# Patient Record
Sex: FEMALE | Race: ASIAN | NOT HISPANIC OR LATINO | Employment: FULL TIME | URBAN - METROPOLITAN AREA
[De-identification: names, ages, dates, MRNs, and addresses within clinical notes are randomized per-mention and may not be internally consistent; named-entity substitution may affect disease eponyms.]

---

## 2018-05-16 ENCOUNTER — OFFICE VISIT (OUTPATIENT)
Dept: URGENT CARE | Facility: URGENT CARE | Age: 48
End: 2018-05-16
Payer: COMMERCIAL

## 2018-05-16 VITALS
DIASTOLIC BLOOD PRESSURE: 67 MMHG | HEART RATE: 68 BPM | HEIGHT: 61 IN | OXYGEN SATURATION: 100 % | BODY MASS INDEX: 21.56 KG/M2 | WEIGHT: 114.2 LBS | TEMPERATURE: 98 F | SYSTOLIC BLOOD PRESSURE: 96 MMHG | RESPIRATION RATE: 16 BRPM

## 2018-05-16 DIAGNOSIS — R82.90 NONSPECIFIC FINDING ON EXAMINATION OF URINE: ICD-10-CM

## 2018-05-16 DIAGNOSIS — R30.0 DYSURIA: Primary | ICD-10-CM

## 2018-05-16 LAB
BACTERIA #/AREA URNS HPF: ABNORMAL /HPF
RBC #/AREA URNS AUTO: ABNORMAL /HPF
URNS CMNT MICRO: ABNORMAL
WBC #/AREA URNS AUTO: ABNORMAL /HPF

## 2018-05-16 PROCEDURE — 87088 URINE BACTERIA CULTURE: CPT | Performed by: FAMILY MEDICINE

## 2018-05-16 PROCEDURE — 87086 URINE CULTURE/COLONY COUNT: CPT | Performed by: FAMILY MEDICINE

## 2018-05-16 PROCEDURE — 81015 MICROSCOPIC EXAM OF URINE: CPT | Performed by: FAMILY MEDICINE

## 2018-05-16 PROCEDURE — 99203 OFFICE O/P NEW LOW 30 MIN: CPT | Performed by: FAMILY MEDICINE

## 2018-05-16 PROCEDURE — 87186 SC STD MICRODIL/AGAR DIL: CPT | Performed by: FAMILY MEDICINE

## 2018-05-16 RX ORDER — NITROFURANTOIN 25; 75 MG/1; MG/1
100 CAPSULE ORAL 2 TIMES DAILY
Qty: 14 CAPSULE | Refills: 0 | Status: SHIPPED | OUTPATIENT
Start: 2018-05-16 | End: 2018-05-23

## 2018-05-16 NOTE — PROGRESS NOTES
"SUBJECTIVE:   Ann Parmar is a 48 year old female who  presents today for a possible UTI. Symptoms of dysuria and frequency have been going on for 1week(s).  Hematuria yes .  sudden onsetand moderate.  There is no history of fever, chills, nausea or vomiting.  No history of vaginal discharge. This patient does  have a history of urinary tract infections. Patient denies rigors, flank pain, temperature > 101 degrees F. and Vomiting, significant nausea or diarrhea or vaginal discharge . She is currently having her periods     No past medical history on file.  No current outpatient prescriptions on file.     Social History   Substance Use Topics     Smoking status: Light Tobacco Smoker     Smokeless tobacco: Never Used     Alcohol use Not on file       ROS:   Review of systems negative except as stated above.    OBJECTIVE:  BP 96/67  Pulse 68  Temp 98  F (36.7  C) (Oral)  Resp 16  Ht 5' 1\" (1.549 m)  Wt 114 lb 3.2 oz (51.8 kg)  SpO2 100%  BMI 21.58 kg/m2  GENERAL APPEARANCE: healthy, alert and no distress  RESP: lungs clear to auscultation - no rales, rhonchi or wheezes  CV: regular rates and rhythm, normal S1 S2, no murmur noted  ABDOMEN:  soft, nontender, no HSM or masses and bowel sounds normal  BACK: No CVA tenderness  SKIN: no suspicious lesions or rashes      Results for orders placed or performed in visit on 05/16/18   Urine Microscopic   Result Value Ref Range    WBC Urine  (A) OTO5^0 - 5 /HPF    RBC Urine 25-50 (A) OTO2^O - 2 /HPF    Bacteria Urine Few (A) NEG^Negative /HPF    Comment Urine Interfering substances, dipstick not done        ASSESSMENT:   Lower, uncomplicated urinary tract infection.  Ann was seen today for urgent care.    Diagnoses and all orders for this visit:    Dysuria  -     Cancel: UA with Microscopic reflex to Culture  -     Urine Microscopic  -     Urine Culture Aerobic Bacterial  -     nitroFURantoin, macrocrystal-monohydrate, (MACROBID) 100 MG capsule; Take 1 " capsule (100 mg) by mouth 2 times daily for 7 days    Nonspecific finding on examination of urine  -     Urine Culture Aerobic Bacterial      Follow up if  symptoms fail to improve or worsens   Pt understood and agreed with plan       PLAN:  As per ordered above  Drink plenty of fluids.  Prevention and treatment of UTI's discussed.Signs and symptoms of pyelonephritis mentioned.  Follow up with primary care physician if not improving

## 2018-05-16 NOTE — MR AVS SNAPSHOT
"              After Visit Summary   2018    Ann Parmar    MRN: [id number]           Patient Information     Date Of Birth          1970        Visit Information        Provider Department      2018 10:10 AM Valery Hernandez MD [facility]        Today's Diagnoses     Dysuria    -  1    Nonspecific finding on examination of urine           Follow-ups after your visit        Who to contact     If you have questions or need follow up information about today's clinic visit or your schedule please contact [facility] directly at [phone].  Normal or non-critical lab and imaging results will be communicated to you by MyChart, letter or phone within 4 business days after the clinic has received the results. If you do not hear from us within 7 days, please contact the clinic through [employer] or phone. If you have a critical or abnormal lab result, we will notify you by phone as soon as possible.  Submit refill requests through [employer] or call your pharmacy and they will forward the refill request to us. Please allow 3 business days for your refill to be completed.          Additional Information About Your Visit        MyChart Information     [employer] lets you send messages to your doctor, view your test results, renew your prescriptions, schedule appointments and more. To sign up, go to [email or web address] . Click on \"Log in\" on the left side of the screen, which will take you to the Welcome page. Then click on \"Sign up Now\" on the right side of the page.     You will be asked to enter the access code listed below, as well as some personal information. Please follow the directions to create your username and password.     Your access code is: [email or web address]  Expires: 2018 10:51 AM     Your access code will  in 90 days. If you need help or a new code, please call your [place] clinic or [phone].        Care EveryWhere ID     " "This is your Care EveryWhere ID. This could be used by other organizations to access your [place] [facility] medical records  [id number]        Your Vitals Were     Pulse Temperature Respirations Height Pulse Oximetry BMI (Body Mass Index)    68 98  F (36.7  C) (Oral) 16 5' 1\" (1.549 m) 100% 21.58 kg/m2       Blood Pressure from Last 3 Encounters:   05/16/18 96/67    Weight from Last 3 Encounters:   05/16/18 114 lb 3.2 oz (51.8 kg)              We Performed the Following     Urine Culture Aerobic Bacterial     Urine Microscopic          Today's Medication Changes          These changes are accurate as of 5/16/18 10:51 AM.  If you have any questions, ask your nurse or doctor.               Start taking these medicines.        Dose/Directions    nitroFURantoin (macrocrystal-monohydrate) 100 MG capsule   Commonly known as:  MACROBID   Used for:  Dysuria   Started by:  Valery Hernandez MD        Dose:  100 mg   Take 1 capsule (100 mg) by mouth 2 times daily for 7 days   Quantity:  14 capsule   Refills:  0            Where to get your medicines      These medications were sent to [facility] [address] [address]     Phone:  [phone]     nitroFURantoin (macrocrystal-monohydrate) 100 MG capsule                Primary Care Provider Fax #    Physician No Ref-Primary [phone]       No address on file        Equal Access to Services     ZBIGNIEW SAINI AH: Hadii abdiel molinao Sonaresh, waaxda luqadaha, qaybta kaalmada adejeffda, agnieszka reis . So [facility] [phone].    ATENCIÓN: Si habla español, tiene a castro disposición servicios gratuitos de asistencia lingüística. Llame al [phone].    We comply with applicable federal civil rights laws and [place] laws. We do not discriminate on the basis of race, color, national origin, age, disability, sex, sexual orientation, or gender identity.            Thank you!     Thank you for " choosing [facility]  for your care. Our goal is always to provide you with excellent care. Hearing back from our patients is one way we can continue to improve our services. Please take a few minutes to complete the written survey that you may receive in the mail after your visit with us. Thank you!             Your Updated Medication List - Protect others around you: Learn how to safely use, store and throw away your medicines at www.disposemymeds.org.          This list is accurate as of 5/16/18 10:51 AM.  Always use your most recent med list.                   Brand Name Dispense Instructions for use Diagnosis    nitroFURantoin (macrocrystal-monohydrate) 100 MG capsule    MACROBID    14 capsule    Take 1 capsule (100 mg) by mouth 2 times daily for 7 days    Dysuria

## 2018-05-17 LAB
BACTERIA SPEC CULT: ABNORMAL
SPECIMEN SOURCE: ABNORMAL

## 2019-07-15 ENCOUNTER — HOSPITAL ENCOUNTER (INPATIENT)
Facility: CLINIC | Age: 49
LOS: 1 days | Discharge: HOME | DRG: 812 | End: 2019-07-16
Attending: INTERNAL MEDICINE | Admitting: INTERNAL MEDICINE

## 2019-07-15 ENCOUNTER — OFFICE VISIT (OUTPATIENT)
Dept: URGENT CARE | Facility: URGENT CARE | Age: 49
End: 2019-07-15

## 2019-07-15 VITALS
WEIGHT: 116 LBS | HEART RATE: 84 BPM | SYSTOLIC BLOOD PRESSURE: 90 MMHG | TEMPERATURE: 99 F | HEIGHT: 61 IN | RESPIRATION RATE: 16 BRPM | DIASTOLIC BLOOD PRESSURE: 50 MMHG | OXYGEN SATURATION: 100 % | BODY MASS INDEX: 21.9 KG/M2

## 2019-07-15 DIAGNOSIS — N93.9 VAGINAL BLEEDING: ICD-10-CM

## 2019-07-15 DIAGNOSIS — N92.1 MENORRHAGIA WITH IRREGULAR CYCLE: Primary | ICD-10-CM

## 2019-07-15 DIAGNOSIS — R53.83 OTHER FATIGUE: ICD-10-CM

## 2019-07-15 DIAGNOSIS — D64.9 ANEMIA, UNSPECIFIED TYPE: ICD-10-CM

## 2019-07-15 DIAGNOSIS — M62.81 GENERALIZED MUSCLE WEAKNESS: ICD-10-CM

## 2019-07-15 DIAGNOSIS — D62 ACUTE BLOOD LOSS ANEMIA: Primary | ICD-10-CM

## 2019-07-15 DIAGNOSIS — D50.0 IRON DEFICIENCY ANEMIA DUE TO CHRONIC BLOOD LOSS: ICD-10-CM

## 2019-07-15 LAB
ALBUMIN SERPL-MCNC: 3.4 G/DL (ref 3.4–5)
ALBUMIN UR-MCNC: ABNORMAL MG/DL
ALP SERPL-CCNC: 49 U/L (ref 40–150)
ALT SERPL W P-5'-P-CCNC: 16 U/L (ref 0–50)
ANION GAP SERPL CALCULATED.3IONS-SCNC: 4 MMOL/L (ref 3–14)
APPEARANCE UR: ABNORMAL
AST SERPL W P-5'-P-CCNC: 11 U/L (ref 0–45)
BACTERIA #/AREA URNS HPF: ABNORMAL /HPF
BASOPHILS # BLD AUTO: 0.1 10E9/L (ref 0–0.2)
BASOPHILS NFR BLD AUTO: 1.1 %
BILIRUB SERPL-MCNC: 0.4 MG/DL (ref 0.2–1.3)
BILIRUB UR QL STRIP: NEGATIVE
BLD PROD TYP BPU: NORMAL
BLD PROD TYP BPU: NORMAL
BLD UNIT ID BPU: 0
BLD UNIT ID BPU: 0
BLOOD PRODUCT CODE: NORMAL
BLOOD PRODUCT CODE: NORMAL
BPU ID: NORMAL
BPU ID: NORMAL
BUN SERPL-MCNC: 11 MG/DL (ref 7–30)
CALCIUM SERPL-MCNC: 8.2 MG/DL (ref 8.5–10.1)
CHLORIDE SERPL-SCNC: 109 MMOL/L (ref 94–109)
CO2 SERPL-SCNC: 26 MMOL/L (ref 20–32)
COLOR UR AUTO: YELLOW
CREAT SERPL-MCNC: 0.56 MG/DL (ref 0.52–1.04)
DIFFERENTIAL METHOD BLD: ABNORMAL
EOSINOPHIL # BLD AUTO: 0.1 10E9/L (ref 0–0.7)
EOSINOPHIL NFR BLD AUTO: 1.1 %
ERYTHROCYTE [DISTWIDTH] IN BLOOD BY AUTOMATED COUNT: 20.6 % (ref 10–15)
FERRITIN SERPL-MCNC: <1 NG/ML (ref 8–252)
GFR SERPL CREATININE-BSD FRML MDRD: >90 ML/MIN/{1.73_M2}
GLUCOSE SERPL-MCNC: 111 MG/DL (ref 70–99)
GLUCOSE UR STRIP-MCNC: NEGATIVE MG/DL
HCG UR QL: NEGATIVE
HCT VFR BLD AUTO: 15.7 % (ref 35–47)
HGB BLD-MCNC: 4.2 G/DL (ref 11.7–15.7)
HGB UR QL STRIP: ABNORMAL
HYALINE CASTS #/AREA URNS LPF: ABNORMAL /LPF
IRON SATN MFR SERPL: 2 % (ref 15–46)
IRON SERPL-MCNC: 9 UG/DL (ref 35–180)
KETONES UR STRIP-MCNC: NEGATIVE MG/DL
LEUKOCYTE ESTERASE UR QL STRIP: NEGATIVE
LYMPHOCYTES # BLD AUTO: 1.5 10E9/L (ref 0.8–5.3)
LYMPHOCYTES NFR BLD AUTO: 17.9 %
MCH RBC QN AUTO: 14.6 PG (ref 26.5–33)
MCHC RBC AUTO-ENTMCNC: 26.8 G/DL (ref 31.5–36.5)
MCV RBC AUTO: 55 FL (ref 78–100)
MONOCYTES # BLD AUTO: 0.3 10E9/L (ref 0–1.3)
MONOCYTES NFR BLD AUTO: 4.2 %
MUCOUS THREADS #/AREA URNS LPF: PRESENT /LPF
NEUTROPHILS # BLD AUTO: 6.2 10E9/L (ref 1.6–8.3)
NEUTROPHILS NFR BLD AUTO: 75.7 %
NITRATE UR QL: NEGATIVE
NON-SQ EPI CELLS #/AREA URNS LPF: ABNORMAL /LPF
PH UR STRIP: 7.5 PH (ref 5–7)
PLATELET # BLD AUTO: 436 10E9/L (ref 150–450)
POTASSIUM SERPL-SCNC: 4 MMOL/L (ref 3.4–5.3)
PROT SERPL-MCNC: 7.3 G/DL (ref 6.8–8.8)
RBC # BLD AUTO: 2.88 10E12/L (ref 3.8–5.2)
RBC #/AREA URNS AUTO: ABNORMAL /HPF
SODIUM SERPL-SCNC: 139 MMOL/L (ref 133–144)
SOURCE: ABNORMAL
SP GR UR STRIP: 1.01 (ref 1–1.03)
TIBC SERPL-MCNC: 459 UG/DL (ref 240–430)
TRANSFUSION STATUS PATIENT QL: NORMAL
UROBILINOGEN UR STRIP-ACNC: 0.2 EU/DL (ref 0.2–1)
WBC # BLD AUTO: 8.1 10E9/L (ref 4–11)
WBC #/AREA URNS AUTO: ABNORMAL /HPF

## 2019-07-15 PROCEDURE — 86923 COMPATIBILITY TEST ELECTRIC: CPT | Performed by: EMERGENCY MEDICINE

## 2019-07-15 PROCEDURE — 12000000 ZZH R&B MED SURG/OB

## 2019-07-15 PROCEDURE — 85025 COMPLETE CBC W/AUTO DIFF WBC: CPT | Performed by: PHYSICIAN ASSISTANT

## 2019-07-15 PROCEDURE — 82728 ASSAY OF FERRITIN: CPT | Performed by: PHYSICIAN ASSISTANT

## 2019-07-15 PROCEDURE — 25800030 ZZH RX IP 258 OP 636: Performed by: INTERNAL MEDICINE

## 2019-07-15 PROCEDURE — 83540 ASSAY OF IRON: CPT | Performed by: PHYSICIAN ASSISTANT

## 2019-07-15 PROCEDURE — 83550 IRON BINDING TEST: CPT | Performed by: PHYSICIAN ASSISTANT

## 2019-07-15 PROCEDURE — 81001 URINALYSIS AUTO W/SCOPE: CPT | Performed by: PHYSICIAN ASSISTANT

## 2019-07-15 PROCEDURE — 36415 COLL VENOUS BLD VENIPUNCTURE: CPT | Performed by: EMERGENCY MEDICINE

## 2019-07-15 PROCEDURE — 80053 COMPREHEN METABOLIC PANEL: CPT | Performed by: PHYSICIAN ASSISTANT

## 2019-07-15 PROCEDURE — 99207 ZZC OFFICE-HOSPITAL ADMIT: CPT | Performed by: PHYSICIAN ASSISTANT

## 2019-07-15 PROCEDURE — 81025 URINE PREGNANCY TEST: CPT | Performed by: PHYSICIAN ASSISTANT

## 2019-07-15 PROCEDURE — 86901 BLOOD TYPING SEROLOGIC RH(D): CPT | Performed by: EMERGENCY MEDICINE

## 2019-07-15 PROCEDURE — 99223 1ST HOSP IP/OBS HIGH 75: CPT | Mod: AI | Performed by: INTERNAL MEDICINE

## 2019-07-15 PROCEDURE — 86900 BLOOD TYPING SEROLOGIC ABO: CPT | Performed by: EMERGENCY MEDICINE

## 2019-07-15 PROCEDURE — 86850 RBC ANTIBODY SCREEN: CPT | Performed by: EMERGENCY MEDICINE

## 2019-07-15 PROCEDURE — P9016 RBC LEUKOCYTES REDUCED: HCPCS | Performed by: EMERGENCY MEDICINE

## 2019-07-15 PROCEDURE — 99285 EMERGENCY DEPT VISIT HI MDM: CPT | Mod: 25

## 2019-07-15 PROCEDURE — 83010 ASSAY OF HAPTOGLOBIN QUANT: CPT | Performed by: EMERGENCY MEDICINE

## 2019-07-15 PROCEDURE — 36430 TRANSFUSION BLD/BLD COMPNT: CPT

## 2019-07-15 RX ORDER — POLYETHYLENE GLYCOL 3350 17 G/17G
17 POWDER, FOR SOLUTION ORAL DAILY PRN
Status: DISCONTINUED | OUTPATIENT
Start: 2019-07-15 | End: 2019-07-16 | Stop reason: HOSPADM

## 2019-07-15 RX ORDER — SODIUM CHLORIDE 9 MG/ML
INJECTION, SOLUTION INTRAVENOUS CONTINUOUS
Status: DISCONTINUED | OUTPATIENT
Start: 2019-07-15 | End: 2019-07-16 | Stop reason: HOSPADM

## 2019-07-15 RX ORDER — POTASSIUM CHLORIDE 1500 MG/1
20-40 TABLET, EXTENDED RELEASE ORAL
Status: DISCONTINUED | OUTPATIENT
Start: 2019-07-15 | End: 2019-07-16 | Stop reason: HOSPADM

## 2019-07-15 RX ORDER — MAGNESIUM SULFATE HEPTAHYDRATE 40 MG/ML
4 INJECTION, SOLUTION INTRAVENOUS EVERY 4 HOURS PRN
Status: DISCONTINUED | OUTPATIENT
Start: 2019-07-15 | End: 2019-07-16 | Stop reason: HOSPADM

## 2019-07-15 RX ORDER — ACETAMINOPHEN 650 MG/1
650 SUPPOSITORY RECTAL EVERY 4 HOURS PRN
Status: DISCONTINUED | OUTPATIENT
Start: 2019-07-15 | End: 2019-07-16 | Stop reason: HOSPADM

## 2019-07-15 RX ORDER — POTASSIUM CHLORIDE 1.5 G/1.58G
20-40 POWDER, FOR SOLUTION ORAL
Status: DISCONTINUED | OUTPATIENT
Start: 2019-07-15 | End: 2019-07-16 | Stop reason: HOSPADM

## 2019-07-15 RX ORDER — ONDANSETRON 2 MG/ML
4 INJECTION INTRAMUSCULAR; INTRAVENOUS EVERY 6 HOURS PRN
Status: DISCONTINUED | OUTPATIENT
Start: 2019-07-15 | End: 2019-07-16 | Stop reason: HOSPADM

## 2019-07-15 RX ORDER — NALOXONE HYDROCHLORIDE 0.4 MG/ML
.1-.4 INJECTION, SOLUTION INTRAMUSCULAR; INTRAVENOUS; SUBCUTANEOUS
Status: DISCONTINUED | OUTPATIENT
Start: 2019-07-15 | End: 2019-07-16 | Stop reason: HOSPADM

## 2019-07-15 RX ORDER — POTASSIUM CHLORIDE 7.45 MG/ML
10 INJECTION INTRAVENOUS
Status: DISCONTINUED | OUTPATIENT
Start: 2019-07-15 | End: 2019-07-16 | Stop reason: HOSPADM

## 2019-07-15 RX ORDER — POTASSIUM CHLORIDE 29.8 MG/ML
20 INJECTION INTRAVENOUS
Status: DISCONTINUED | OUTPATIENT
Start: 2019-07-15 | End: 2019-07-16 | Stop reason: HOSPADM

## 2019-07-15 RX ORDER — ACETAMINOPHEN 325 MG/1
650 TABLET ORAL EVERY 4 HOURS PRN
Status: DISCONTINUED | OUTPATIENT
Start: 2019-07-15 | End: 2019-07-16 | Stop reason: HOSPADM

## 2019-07-15 RX ORDER — ONDANSETRON 4 MG/1
4 TABLET, ORALLY DISINTEGRATING ORAL EVERY 6 HOURS PRN
Status: DISCONTINUED | OUTPATIENT
Start: 2019-07-15 | End: 2019-07-16 | Stop reason: HOSPADM

## 2019-07-15 RX ORDER — BISACODYL 10 MG
10 SUPPOSITORY, RECTAL RECTAL DAILY PRN
Status: DISCONTINUED | OUTPATIENT
Start: 2019-07-15 | End: 2019-07-16 | Stop reason: HOSPADM

## 2019-07-15 RX ORDER — AMOXICILLIN 250 MG
1 CAPSULE ORAL 2 TIMES DAILY PRN
Status: DISCONTINUED | OUTPATIENT
Start: 2019-07-15 | End: 2019-07-16 | Stop reason: HOSPADM

## 2019-07-15 RX ORDER — CALCIUM CARBONATE 500 MG/1
1000 TABLET, CHEWABLE ORAL 4 TIMES DAILY PRN
Status: DISCONTINUED | OUTPATIENT
Start: 2019-07-15 | End: 2019-07-16 | Stop reason: HOSPADM

## 2019-07-15 RX ORDER — AMOXICILLIN 250 MG
2 CAPSULE ORAL 2 TIMES DAILY PRN
Status: DISCONTINUED | OUTPATIENT
Start: 2019-07-15 | End: 2019-07-16 | Stop reason: HOSPADM

## 2019-07-15 RX ORDER — POTASSIUM CL/LIDO/0.9 % NACL 10MEQ/0.1L
10 INTRAVENOUS SOLUTION, PIGGYBACK (ML) INTRAVENOUS
Status: DISCONTINUED | OUTPATIENT
Start: 2019-07-15 | End: 2019-07-16 | Stop reason: HOSPADM

## 2019-07-15 RX ORDER — LIDOCAINE 40 MG/G
CREAM TOPICAL
Status: DISCONTINUED | OUTPATIENT
Start: 2019-07-15 | End: 2019-07-16 | Stop reason: HOSPADM

## 2019-07-15 RX ADMIN — SODIUM CHLORIDE: 9 INJECTION, SOLUTION INTRAVENOUS at 22:41

## 2019-07-15 SDOH — HEALTH STABILITY: MENTAL HEALTH: HOW OFTEN DO YOU HAVE A DRINK CONTAINING ALCOHOL?: NEVER

## 2019-07-15 ASSESSMENT — ACTIVITIES OF DAILY LIVING (ADL): ADLS_ACUITY_SCORE: 12

## 2019-07-15 ASSESSMENT — ENCOUNTER SYMPTOMS
FATIGUE: 1
DIZZINESS: 1
HEADACHES: 1

## 2019-07-15 ASSESSMENT — MIFFLIN-ST. JEOR
SCORE: 1088.55
SCORE: 1072.67

## 2019-07-15 NOTE — PROGRESS NOTES
RECEIVING UNIT ED HANDOFF REVIEW    ED Nurse Handoff Report was reviewed by: Lorena Barcenas on July 15, 2019 at 5:00 PM

## 2019-07-15 NOTE — H&P
H&P dictated    49 year old female with hx of menorrhagia who is being admitted for severe anemia due to heavy vaginal bleeding.    - She will receive 2 units prbcs now  - Conditional unit available for Hgb 7 or less  - Ob/Gyn consult; will defer pertinent imaging to Ob/Gyn    KINGSLEY Shankar MD  Hospitalist  [phone]

## 2019-07-15 NOTE — ED PROVIDER NOTES
History     Chief Complaint:  Anemia    HPI   Ann Parmar is a 49 year old female who presents with anemia. The patient reports that she initially went to [facility] for vaginal bleeding, thinking it was pre-menopausal. Here, she states that her bleeding has since tapered off, but over the last couple of days she was bleeding through one pad and one tampon every hour, sometimes less. She endorses associated dizziness, migraines, and inability to stand without the feeling of wanting to fall over. She states a history of ectopic pregnancy, which resulted in internal bleeding and need for blood transfusion, but no blood transfusion within the last 17 years. She also states a history of long and excessive bleeding in the past during her menstrual cycles.     Allergies:  No known drug allergies     Medications:    The patient is not currently taking any prescribed medications.    Past Medical History:    The patient does not have any past pertinent medical history.    Past Surgical History:    History reviewed. No pertinent surgical history.    Family History:    History reviewed. No pertinent family history.     Social History:  Smoking status: Light tobacco smoker  Alcohol use: No  Drug use: No  Presents to the ED with herself  Marital Status:       Review of Systems   Constitutional: Positive for fatigue.   Genitourinary: Positive for vaginal bleeding.   Neurological: Positive for dizziness and headaches.   All other systems reviewed and are negative.      Physical Exam     Patient Vitals for the past 24 hrs:   BP Temp Temp src Pulse Resp SpO2 Height Weight   07/15/19 1527 111/63 98.1  F (36.7  C) Oral 77 18 100 % 1.524 m (5') 52.6 kg (116 lb)       Physical Exam  Vitals: reviewed by me  General: Pt seen on [facility] [place], pleasant, cooperative, and alert to conversation  Eyes: Tracking well, clear conjunctiva BL  ENT: MMM, midline trachea.   Lungs:   No tachypnea, no accessory  muscle use. No respiratory distress.   CV: Rate as above, regular rhythm.    Abd: Soft, non tender, no guarding, no rebound. Non distended  Pelvic exam done with RN chaperone in the normal external exam, scant venous bleeding noted in the vault, no refilling, no evidence of trauma.  MSK: no peripheral edema or joint effusion.  No evidence of trauma  Skin: No rash, normal turgor and temperature  Neuro: Clear speech and no facial droop.  Psych: Not RIS, no e/o AH/VH      Emergency Department Course   Laboratory:  ABO/Rh type and screen: In process    Interventions:  2 units RBCs     Emergency Department Course:  Past medical records, nursing notes, and vitals reviewed.  1548: I performed an exam of the patient and obtained history, as documented above.    IV inserted and blood drawn.    Findings and plan explained to the patient who consents to admission.     1622: Discussed the patient with Dr. Shankar, who will admit the patient to an observation bed for further monitoring, evaluation, and treatment.     Impression & Plan    Medical Decision Making:  Abida Parmar is a very pleasant 49 year old female who presents to the emergency department with what appears to be vaginal bleeding, leading to fairly significant anemia. Her hemoglobin was 4 today at [facility] and this does fit with the patient s symptoms of heavy vaginal bleeding for the last week or so. Patient is not pregnant, her MCV is low, consistent with iron deficiency related to anemia, blood pressure is soft, but she is with minimal bleeding now on my exam and states that she otherwise feels well when not moving around.    She is consented for two units and she will be receiving a transfusion here, but I do think that she needs to be admitted. She certainly could just be going through menopause and have an atypically large amount of bleeding, but there are additionally concerns such as a possible malignancy. I spoke briefly with OB-GYN who  kindly agrred to consult the patient, but given the undeclared nature of the pt s anemia, and the robust level of her hemoglobin, we did agree that the patient should be admitted to a hospitalist with OB-GYN consulting. Will admit to the care of Dr. Shankar who generously agrees to accept care of the patient.    Diagnosis:    ICD-10-CM    1. Anemia, unspecified type D64.9 CANCELED: Ferritin     CANCELED: Iron and iron binding capacity   2. Vaginal bleeding N93.9        Disposition: Admitted to observation bed.    I, Marialuisa Bryan, am serving as a scribe at 3:48 PM on 7/15/2019 to document services personally performed by Oliver Griffin MD based on my observations and the provider's statements to me.     Marialuisa Bryan  7/15/2019    EMERGENCY DEPARTMENT       Oliver Griffin MD  07/15/19 3333

## 2019-07-15 NOTE — H&P
DATE OF ADMISSION:     07/15/2019      PRIMARY CARE PHYSICIAN:  [facility] in [place].      CHIEF COMPLAINT:  Generalized weakness, fatigue, dizziness.      HISTORY OF PRESENT ILLNESS:  Abida Parmar is a 49-year-old female with history of menorrhagia and ectopic pregnancy who was referred to the Emergency Department from her primary care clinic for severe anemia with a hemoglobin of 4.2.  The patient reports a 1-week history of heavy vaginal bleeding which she first thought was her menstrual period.  She does have a history of menorrhagia; however, reports that over the past week her vaginal bleeding has been much heavier than her typical menstrual periods.  She reports soaking through a thick pad or tampon every hour until today when her vaginal bleeding began tapering off.  Pertinent medical history includes a history of 2 prior normal vaginal deliveries and a sister with prior history of fibroids requiring surgery.    Over the past few days, she has developed generalized weakness, headache, fatigue, and dizziness/lightheadedness.  She denies syncope or falls.  Due to these constitutional symptoms, she presented to her primary care clinic for further evaluation today, and was found to have severe anemia with a hemoglobin of 4.2.     In the Emergency Department, 2 units of packed red blood cells were ordered and Ob/Gyn was notified; however, they have no formal recommendations at this time.     REVIEW OF SYSTEMS:  A 10-point review of systems was performed and pertinent positives and negatives are otherwise noted in the HPI.      MEDICATIONS:  Takes melatonin occasionally.      PAST MEDICAL HISTORY:   1.  Menorrhagia  2.  History of ectopic pregnancy     PAST SURGICAL HISTORY:  She has no prior history of surgeries.      FAMILY HISTORY:  She has a sister with a history of fibroids requiring surgery.      SOCIAL HISTORY:  The patient smokes cigarettes occasionally.  She reports seldom alcohol  use.  She lives at home with her family and has 2 healthy children.  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      PHYSICAL EXAMINATION:   VITAL SIGNS:  Temperature 98.1, blood pressure 111/63, heart rate 77, respiratory rate 18, SpO2 of 100% on room air.   CONSTITUTIONAL:  Appears comfortable, in no acute distress.   HEENT:  Normocephalic, atraumatic.  Sclerae are white, conjunctivae clear, extraocular movements intact.  Mucous membranes moist.   RESPIRATORY:  Breathing nonlabored.  Lungs clear to auscultation bilaterally.  No wheezes, crackles or rhonchi.   CARDIOVASCULAR:  Heart regular rate and rhythm, no murmurs, rubs or gallops.  No pedal edema.   GASTROINTESTINAL:  Bowel sounds present.  Abdomen soft and nontender.   LYMPH/HEMATOLOGIC:  No cervical lymphadenopathy.     GENITOURINARY:  Scant bleeding on pad.   SKIN/INTEGUMENT:  Warm and dry.  No rash.   MUSCULOSKELETAL:  Normal muscle bulk and tone.   NEUROLOGIC:  Alert and appropriate.  Moves all extremities.   PSYCHIATRIC:  Calm and cooperative.      LABORATORY DATA:  Labs were reviewed and notable for hemoglobin of 4.2.  Iron studies are significant for severe iron deficiency anemia with iron of 9 and iron saturation of 2%.  Ferritin is pending.  Her metabolic panel is grossly normal.      IMAGING DATA:  I did not review any images or EKGs today.        ASSESSMENT AND PLAN:  Ann Parmar is a 49-year-old female with history of menorrhagia who is being admitted for severe anemia with hemoglobin of 4.2 due to severe vaginal bleeding.      1.  Vaginal bleeding with severe iron deficiency and acute blood loss anemia  Initially presented to her primary care clinic with generalized weakness, fatigue, headaches, and pre-syncope in the setting of 1-week history of very heavy vaginal bleeding beyond her typical menstrual periods. She was found to have a hemoglobin of 4.2. Iron studies on arrival were significant for severe iron deficiency with iron of 9  and iron sat 2%. Ferritin is pending.  She has a personal history of ectopic pregnancy and 2 prior normal vaginal deliveries. Urine pregnancy test on arrival was negative. She has a sister with a history of fibroids requiring surgery.  OB/GYN has been consulted, will defer further imaging to them.  In the meantime, she will receive 2 units of prbcs with a conditional unit available for Hgb 7 or less. She will also receive IV fluids, and we will check orthostatics daily until they are normal.     2.  Fluid, electrolytes, nutrition:  Regular diet, normal saline at 100 mL per hour.     3.  Deep venous thrombosis prophylaxis:  Pneumatic compression devices.      CODE STATUS:  Full code.         DAWN SHAH MD             D: 07/15/2019   T: 07/15/2019   MT: LORENZO      Name:     TORY RHODES   MRN:      [id number]        Account:      [id number]   :      1970        Admitted:     07/15/2019                   Document: [id number]

## 2019-07-15 NOTE — ED NOTES
"[facility]  ED Nurse Handoff Report    ED Chief complaint: Anemia      ED Diagnosis:   Final diagnoses:   Anemia, unspecified type   Vaginal bleeding       Code Status: Full Code    Allergies: No Known Allergies    Activity level - Baseline/Home:  Independent  Activity Level - Current:   Stand with Assist    Patient's Preferred language: English   Needed?: No    Isolation: No  Infection: Not Applicable  Bariatric?: No    Vital Signs:   Vitals:    07/15/19 1527   BP: 111/63   Pulse: 77   Resp: 18   Temp: 98.1  F (36.7  C)   TempSrc: Oral   SpO2: 100%   Weight: 52.6 kg (116 lb)   Height: 1.524 m (5')       Cardiac Rhythm: ,        Pain level: 0-10 Pain Scale: 4    Is this patient confused?: No   Does this patient have a guardian?  No         If yes, is there guardianship documents in the Epic \"Code/ACP\" activity?  No         Guardian Notified?  N/A  [place] - Suicide Severity Rating Scale Completed?  No, secondary to n/a  If yes, what color did the patient score?  N/A    Patient Report: Initial Complaint: Heavy periods, weakness, dizziness, shortness of breath. Sent from clinic d/t anemia  Focused Assessment: Patient noted to be pale. Reports vaginal bleeding has slowed, but \"it was like niagra falls when I stood up when it was really heavy. I had to change a tampon and pad at least every hour.\" Reports dizziness and shortness of breath with activities. History of insomnia and reports headaches this last week as well.  Tests Performed: Labs  Abnormal Results:   Labs Ordered and Resulted from Time of ED Arrival Up to the Time of Departure from the ED   HAPTOGLOBIN   ABO/RH TYPE AND SCREEN   RED BLOOD CELL PREPARE ORDER UNIT       Treatments provided: Monitoring    Family Comments:     OBS brochure/video discussed/provided to patient/family: No              Name of person given brochure if not patient: n/a              Relationship to patient: n/a    ED Medications: Medications - No " data to display    Drips infusing?:  Yes, 1st unit of PRBCs started at 1709    For the majority of the shift this patient was Green.   Interventions performed were n/a.    Severe Sepsis OR Septic Shock Diagnosis Present: No    To be done/followed up on inpatient unit:  Continue monitoring, blood administration    ED NURSE PHONE NUMBER: [id number]

## 2019-07-15 NOTE — PHARMACY-ADMISSION MEDICATION HISTORY
Admission medication history interview status for the 7/15/2019  admission is complete. See EPIC admission navigator for prior to admission medications     Medication history source reliability:Good- patient    Actions taken by pharmacist (provider contacted, etc):None     Additional medication history information not noted on PTA med list :  -She reports taking no medications except tried melatonin 10mg couple times in past week for sleep but did not have benefit from it.    Medication reconciliation/reorder completed by provider prior to medication history? No    Time spent in this activity: 5 min    Prior to Admission medications    Not on File

## 2019-07-16 VITALS
TEMPERATURE: 98.2 F | HEART RATE: 66 BPM | SYSTOLIC BLOOD PRESSURE: 100 MMHG | RESPIRATION RATE: 16 BRPM | DIASTOLIC BLOOD PRESSURE: 64 MMHG | HEIGHT: 60 IN | BODY MASS INDEX: 22.78 KG/M2 | OXYGEN SATURATION: 99 % | WEIGHT: 116 LBS

## 2019-07-16 LAB
ABO + RH BLD: NORMAL
ABO + RH BLD: NORMAL
ANION GAP SERPL CALCULATED.3IONS-SCNC: 4 MMOL/L (ref 3–14)
BLD GP AB SCN SERPL QL: NORMAL
BLD PROD TYP BPU: NORMAL
BLD PROD TYP BPU: NORMAL
BLD UNIT ID BPU: 0
BLOOD BANK CMNT PATIENT-IMP: NORMAL
BLOOD PRODUCT CODE: NORMAL
BPU ID: NORMAL
BUN SERPL-MCNC: 8 MG/DL (ref 7–30)
CALCIUM SERPL-MCNC: 8.5 MG/DL (ref 8.5–10.1)
CHLORIDE SERPL-SCNC: 113 MMOL/L (ref 94–109)
CO2 SERPL-SCNC: 26 MMOL/L (ref 20–32)
CREAT SERPL-MCNC: 0.63 MG/DL (ref 0.52–1.04)
ERYTHROCYTE [DISTWIDTH] IN BLOOD BY AUTOMATED COUNT: ABNORMAL % (ref 10–15)
GFR SERPL CREATININE-BSD FRML MDRD: >90 ML/MIN/{1.73_M2}
GLUCOSE SERPL-MCNC: 129 MG/DL (ref 70–99)
HAPTOGLOB SERPL-MCNC: 86 MG/DL (ref 15–200)
HCT VFR BLD AUTO: 26.7 % (ref 35–47)
HGB BLD-MCNC: 6.7 G/DL (ref 11.7–15.7)
HGB BLD-MCNC: 8.3 G/DL (ref 11.7–15.7)
MAGNESIUM SERPL-MCNC: 2.3 MG/DL (ref 1.6–2.3)
MCH RBC QN AUTO: 20.6 PG (ref 26.5–33)
MCHC RBC AUTO-ENTMCNC: 31.1 G/DL (ref 31.5–36.5)
MCV RBC AUTO: 66 FL (ref 78–100)
NUM BPU REQUESTED: 3
PLATELET # BLD AUTO: 302 10E9/L (ref 150–450)
POTASSIUM SERPL-SCNC: 3.9 MMOL/L (ref 3.4–5.3)
RBC # BLD AUTO: 4.02 10E12/L (ref 3.8–5.2)
SODIUM SERPL-SCNC: 143 MMOL/L (ref 133–144)
SPECIMEN EXP DATE BLD: NORMAL
TRANSFUSION STATUS PATIENT QL: NORMAL
TRANSFUSION STATUS PATIENT QL: NORMAL
WBC # BLD AUTO: 6.7 10E9/L (ref 4–11)

## 2019-07-16 PROCEDURE — 85018 HEMOGLOBIN: CPT | Performed by: INTERNAL MEDICINE

## 2019-07-16 PROCEDURE — 85027 COMPLETE CBC AUTOMATED: CPT | Performed by: INTERNAL MEDICINE

## 2019-07-16 PROCEDURE — 36415 COLL VENOUS BLD VENIPUNCTURE: CPT | Performed by: INTERNAL MEDICINE

## 2019-07-16 PROCEDURE — 83735 ASSAY OF MAGNESIUM: CPT | Performed by: INTERNAL MEDICINE

## 2019-07-16 PROCEDURE — 80048 BASIC METABOLIC PNL TOTAL CA: CPT | Performed by: INTERNAL MEDICINE

## 2019-07-16 PROCEDURE — 25800030 ZZH RX IP 258 OP 636: Performed by: INTERNAL MEDICINE

## 2019-07-16 PROCEDURE — 99238 HOSP IP/OBS DSCHRG MGMT 30/<: CPT | Performed by: INTERNAL MEDICINE

## 2019-07-16 PROCEDURE — P9016 RBC LEUKOCYTES REDUCED: HCPCS | Performed by: EMERGENCY MEDICINE

## 2019-07-16 PROCEDURE — 25000128 H RX IP 250 OP 636: Performed by: INTERNAL MEDICINE

## 2019-07-16 RX ORDER — FERROUS SULFATE 325(65) MG
325 TABLET ORAL 2 TIMES DAILY
Qty: 60 TABLET | Refills: 0 | Status: SHIPPED | OUTPATIENT
Start: 2019-07-16 | End: 2019-08-15

## 2019-07-16 RX ADMIN — SODIUM CHLORIDE: 9 INJECTION, SOLUTION INTRAVENOUS at 10:50

## 2019-07-16 RX ADMIN — IRON SUCROSE 300 MG: 20 INJECTION, SOLUTION INTRAVENOUS at 14:36

## 2019-07-16 ASSESSMENT — ACTIVITIES OF DAILY LIVING (ADL)
ADLS_ACUITY_SCORE: 10

## 2019-07-16 NOTE — PLAN OF CARE
DATE & TIME: 7/15/19, 4736 - 3584   Cognitive Concerns/ Orientation : A&O x 4   BEHAVIOR & AGGRESSION TOOL COLOR: Green   ABNL VS/O2: BP soft. Other VSS on room air  MOBILITY: SBA to BSC. Fall risk due to mild lightheadedness  PAIN MANAGMENT: Denied  DIET: Regular  BOWEL/BLADDER: Continent. Small clots seen in urine  ABNL LAB/BG: Hgb 6.7  DRAIN/DEVICES: PIV infusing at 100 ml/hr  SKIN: Intact  TESTS/PROCEDURES: For OBGYN consult today  D/C DAY/GOALS/PLACE: Discharge pending progress  OTHER IMPORTANT INFO: Patient completed 3rd unit of PRBCs with no adverse reaction. For recheck of Hgb with AM labs. Patient having very slight vaginal bleeding. Reported very mild lightheadedness with getting out of bed.

## 2019-07-16 NOTE — PROVIDER NOTIFICATION
MD Notification    Notified Person: MD    Notified Person Name: Dr. Luevano    Notification Date/Time: 7/16/19 0106    Notification Interaction: Telephone    Purpose of Notification: Critical Hgb 6.7    Orders Received: Will give another unit of PRBCs from conditional prepare order.

## 2019-07-16 NOTE — PLAN OF CARE
Admission    Patient arrives to room 621-2 via cart from ED.  Care plan note: Patient came from ED with blood transfusing r/t Hgb 4.2.  Patient is A&Ox4; calls apprpriately; SBA.  VSS; O2sats 90's RA; no pain/N/V/SOB.  Patient has no further bleeding thus far.    Inpatient nursing criteria listed below were met:    PCD's Documented: Yes  Skin issues/needs documented : NA  Isolation education started/completed NA  Patient allergies verified with patient: Yes - NKDA  Verified completion of [place] Risk Assessment Tool:  Yes  Verified completion of Guardianship screening tool: Yes  Fall Prevention: Yes  Care Plan initiated: Yes  Home medications documented in belongings flowsheet: NA  Patient belongings documented in belongings flowsheet: No  Reminder note (belongings/ medications) placed in discharge instructions: No  Admission profile/ required documentation complete: No  Bedside Report Letter given and explained to patient Yes

## 2019-07-16 NOTE — DISCHARGE SUMMARY
[facility]    Discharge Summary  Hospitalist    Date of Admission:  7/15/2019  Date of Discharge:  7/16/2019  Discharging Provider: Griselda Fraga    Discharge Diagnoses   Severe Iron Deficiency and Acute Blood Loss Anemia dt Vaginal Bleeding   Menorrhagia    History of Present Illness   Ann Parmar is a 49 year old female with PMHx of menorrhagia who was admitted on 7/15/2019 with acute blood loss anemia dt ongoing vaginal bleeding with hgb 4.2.    Hospital Course   Ann Parmar was admitted on 7/15/2019.  The following problems were addressed during her hospitalization:    Severe Iron Deficiency and Acute Blood Loss Anemia dt Vaginal Bleeding   Initially presented to her PCP with complaints of generalized weakness, fatigue, headaches, and pre-syncope in the setting of 1-week history of very heavy vaginal bleeding beyond her typical menstrual periods. She was found to have a hemoglobin of 4.2. Iron studies on arrival were significant for severe iron deficiency with iron of 9 and iron sat 2%. Ferritin low (<1). Has hx of ectopic pregnancy and 2 prior normal vaginal deliveries. Urine pregnancy test on arrival was negative. Has a sister with a history of fibroids requiring surgery.     Transfused total of 3U PRBCs this stay. Hgb improved to 8.3. Bleeding slowed to spotting during stay. Seen by Dr. Momin of [facility]. Recommended IV iron infusion and discharge on iron supplement. Will follow up in clinic for additional testing and evaluation (with pelvic US, endometrial biopsy) and recommendations for management.     Griselda Fraga    Code Status   Full Code       Primary Care Physician   Physician No Ref-Primary    Physical Exam   Temp: 97.9  F (36.6  C) Temp src: Oral BP: 101/64 Pulse: 66 Heart Rate: 63 Resp: 16 SpO2: 99 % O2 Device: None (Room air)    Vitals:    07/15/19 1527   Weight: 52.6 kg (116 lb)     Vital Signs with Ranges  Temp:  [97.8  F  (36.6  C)-99  F (37.2  C)] 97.9  F (36.6  C)  Pulse:  [66-87] 66  Heart Rate:  [63-67] 63  Resp:  [16-18] 16  BP: ()/(45-69) 101/64  SpO2:  [99 %-100 %] 99 %  I/O last 3 completed shifts:  In: 1070 [P.O.:120]  Out: 1000 [Urine:1000]    General: Resting comfortably, alert, conversive, NAD  CVS: HRRR, no MGR, no LE edema  Respiratory: CTAB, no wheeze/rales/rhonchi, no increased work of breathing  GI: S, NT, ND, +S  Skin: Warm/dry    Discharge Disposition   Discharged to home  Condition at discharge: Stable    Consultations This Hospital Stay   OB GYN IP CONSULT    Time Spent on this Encounter   I, Griselda Fraga, personally saw the patient today and spent less than or equal to 30 minutes discharging this patient.    Discharge Orders      Reason for your hospital stay    Evaluation and management of your low blood counts, for which you required transfusion of 3 units of red blood cells.     Follow-up and recommended labs and tests     1. Follow up with Dr. Momin at [facility] as directed to schedule additional testing to determine the cause of your vaginal bleeding.     Activity    Your activity upon discharge: activity as tolerated     Diet    Follow this diet upon discharge: Regular     Discharge Medications   Current Discharge Medication List      START taking these medications    Details   ferrous sulfate (FEROSUL) 325 (65 Fe) MG tablet Take 1 tablet (325 mg) by mouth 2 times daily  Qty: 60 tablet, Refills: 0    Associated Diagnoses: Vaginal bleeding; Acute blood loss anemia           Allergies   No Known Allergies     Data   Most Recent 3 CBC's:  Recent Labs   Lab Test 07/16/19  0834 07/16/19  0033 07/15/19  1420   WBC 6.7  --  8.1   HGB 8.3* 6.7* 4.2*   MCV 66*  --  55*     --  436      Most Recent 3 BMP's:  Recent Labs   Lab Test 07/16/19  0834 07/15/19  1420    139   POTASSIUM 3.9 4.0   CHLORIDE 113* 109   CO2 26 26   BUN 8 11   CR 0.63 0.56   ANIONGAP 4 4   FAUSTO 8.5 8.2*    * 111*     Most Recent 2 LFT's:  Recent Labs   Lab Test 07/15/19  1420   AST 11   ALT 16   ALKPHOS 49   BILITOTAL 0.4

## 2019-07-16 NOTE — PLAN OF CARE
DATE & TIME: 7/15/19 9633-0551  Cognitive Concerns/ Orientation : Pt A/Ox4   BEHAVIOR & AGGRESSION TOOL COLOR: Green   ABNL VS/O2: VSS on RA, except BP 86/46, MD aware  MOBILITY: SBA to BSC, fall risk  PAIN MANAGMENT: Denies  DIET: Regular  BOWEL/BLADDER: Continent, using BSC. Clots in urine.  ABNL LAB/BG: Hgb 4.2  DRAIN/DEVICES: IVF infusing  SKIN: Intact  TESTS/PROCEDURES: OBGYN consult tomorrow  D/C DAY/GOALS/PLACE: Discharge pending progress  OTHER IMPORTANT INFO: Pt currently receiving 2nd unit of PRBCs. After this unit will recheck Hgb. If Hgb <7 will give another unit. IVF to start after PRBCs. Complaints of dizziness at time when getting out of bed. Pt having slight vaginal bleeding, spotting. Pad changed once this shift, pt states last time it was changed was noon.

## 2019-07-16 NOTE — CONSULTS
"GYN CONSULT    Patient is being seen in consultation at the request of Dr. Shankar.    HPI:  Pt is a 49 year old G3 P  with LMP 2 weeks ago who presented to [facility] clinic c/o vaginal bleeding x 2 weeks, fatigue, SOB. Hbg was found to be 4.2 and she was sent to [facility] ER on 7/15/19.  Now s/p 3 units PRBCs. Vaginal bleeding now minimal.    Pt related a longstanding hx of menorrhagia. Menses monthly, linger up to 2 weeks. Flow initially heavy, then lighter then heavy again. No pelvic pain. No hx bleeding disorder. No other pelvic sx. Hx OCPs in distant past, did not like. Does not use any birth control as tubes supposedly blocked.     Pt has not seen an OBGYN in years. No hx anemia other than with ectopic though has not seen MDs regularly    OBHx:   x 2, ages 21, 25    GynHx: Surgery for ectopic pregnancy 1 yrs ago. \"internal bleeding\" requiring transfusion. Told both her tubes were blocked with scar tissue.     Med Hx: History reviewed. No pertinent past medical history.    Surg Hx:  History reviewed. Surgery for ectopic pregnancy    Meds:  none    Allergies: No Known Allergies    Soc Hx:   Social History     Socioeconomic History     Marital status:      Spouse name: Not on file     Number of children: Not on file     Years of education: Not on file     Highest education level: Not on file   Occupational History     Not on file   Social Needs     Financial resource strain: Not on file     Food insecurity:     Worry: Not on file     Inability: Not on file     Transportation needs:     Medical: Not on file     Non-medical: Not on file   Tobacco Use     Smoking status: Light Tobacco Smoker     Smokeless tobacco: Never Used   Substance and Sexual Activity     Alcohol use: Never     Frequency: Never     Drug use: Never     Sexual activity: Not on file   Lifestyle     Physical activity:     Days per week: Not on file     Minutes per session: Not on file     Stress: Not on file   Relationships     " Social connections:     Talks on phone: Not on file     Gets together: Not on file     Attends [employer] service: Not on file     Active member of club or organization: Not on file     Attends meetings of clubs or organizations: Not on file     Relationship status: Not on file     Intimate partner violence:     Fear of current or ex partner: Not on file     Emotionally abused: Not on file     Physically abused: Not on file     Forced sexual activity: Not on file   Other Topics Concern     Not on file   Social History Narrative     Not on file       Fam Hx: History reviewed. No pertinent family history.    PE:    VS:  /64 (BP Location: Left arm)   Pulse 66   Temp 97.9  F (36.6  C) (Oral)   Resp 16   Ht 1.524 m (5')   Wt 52.6 kg (116 lb)   SpO2 99%   BMI 22.65 kg/m    Gen:  A&O, NAD  Lungs:  CTAB  CV:  RRR  Abd:  Soft, flat, non tender, no masses  Pelvic: bimanual done in bed: vaginal without masses, cervix palpably normal. Uterus feels slightly enlarged and irregular. No adnexal masses      Procedure Component Value Units Date/Time   Haptoglobin [[id number]] Collected: 07/15/19 1944   Specimen: Blood Updated: 07/16/19 1148    Haptoglobin 86 mg/dL    CBC with platelets [[id number]] (Abnormal) Collected: 07/16/19 0834   Specimen: Blood Updated: 07/16/19 1016    WBC 6.7 10e9/L     RBC Count 4.02 10e12/L     Hemoglobin 8.3Low  g/dL     Hematocrit 26.7Low  %     MCV 66Low  fl     MCH 20.6Low  pg     MCHC 31.1Low  g/dL     RDW      Dimorphic population - unable to calculate    %    Platelet Count 302 10e9/L    Basic metabolic panel [[id number]] (Abnormal) Collected: 07/16/19 0834   Specimen: Blood Updated: 07/16/19 0958    Sodium 143 mmol/L     Potassium 3.9 mmol/L     Chloride 113High  mmol/L     Carbon Dioxide 26 mmol/L     Anion Gap 4 mmol/L     Glucose 129High  mg/dL     Urea Nitrogen 8 mg/dL     Creatinine 0.63 mg/dL     GFR Estimate >90 mL/min/     GFR Estimate If Black >90 mL/min/     Calcium 8.5 mg/dL     Magnesium [[id number]] Collected: 19 0834   Specimen: Blood Updated: 19 0952    Magnesium 2.3 mg/dL          Imaging: none to date    A/P:  49 year old  admitted with severe anemia, presumably from chronic menorrhagia. Now s/p 3 unit(s) prbcs with hgb 8.3. Vaginal bleeding now minimal. Sx improved. Ambulating without issue    1. Discussed situation with patient and recommended further evaluation which can be done as an out patient. Would recommend pelvic US, endometrial biopsy, discussion of options to control menorrhagia (determined by her US results). Consider Mirena IUD if uterine cavity normal, ablation, hysterectomy  2. Consider IV iron today prior to discharge  3. Recommend po fe bid upon discharge  4. My office, [facility]ett[facility] will call patient tomorrow (when she has her cell phone) to set up US, office visit with endometrial biopsy and discussion of plan, within the next week.   5. No current hormone treatment indicated as pt's bleeding has subsided   6. Thank you for consulting us.     Anne Momin  2019  1:44 PM

## 2019-07-16 NOTE — PLAN OF CARE
DATE & TIME: 7/15/2019 8193-3056  Cognitive Concerns/ Orientation : A&Ox4   BEHAVIOR & AGGRESSION TOOL COLOR: green  CIWA SCORE: NA  ABNL VS/O2: NA  MOBILITY: SBA  PAIN MANAGMENT: Denies  DIET: Regular  BOWEL/BLADDER: Continent  ABNL LAB/BG: Hgb 4.2  DRAIN/DEVICES: PIV   TELEMETRY RHYTHM: NA  SKIN: Intact  TESTS/PROCEDURES: Received 1 of 2 units of PRBC and if Hgb <7 - transfuse 1 more unit (conditional order)  D/C DAY/GOALS/PLACE: Pending improvement in Hgb  OTHER IMPORTANT INFO: OB/Gyn to see.

## 2019-07-16 NOTE — PROGRESS NOTES
SUBJECTIVE:  Ann Parmar is a 49 year old female who presents with weakness, dizziness, ongoing vaginal bleeding.    Onset of symptoms 3 day(s) ago, rapidly worsening since.     Pain:none.     Vaginal bleeding: Yes      Vaginal symptoms: vaginal bleeding  No LMP recorded.    Sexually active: yes  Predisposing factors: hx of heavy menstrual periods  Hx of previous symptom: yes    PMH  Heavy menstrual periods      No current outpatient medications on file.     Social History     Socioeconomic History     Marital status:      Spouse name: Not on file     Number of children: Not on file     Years of education: Not on file     Highest education level: Not on file   Occupational History     Not on file   Social Needs     Financial resource strain: Not on file     Food insecurity:     Worry: Not on file     Inability: Not on file     Transportation needs:     Medical: Not on file     Non-medical: Not on file   Tobacco Use     Smoking status: Light Tobacco Smoker     Smokeless tobacco: Never Used   Substance and Sexual Activity     Alcohol use: Never     Frequency: Never     Drug use: Never     Sexual activity: Not on file   Lifestyle     Physical activity:     Days per week: Not on file     Minutes per session: Not on file     Stress: Not on file   Relationships     Social connections:     Talks on phone: Not on file     Gets together: Not on file     Attends [employer] service: Not on file     Active member of club or organization: Not on file     Attends meetings of clubs or organizations: Not on file     Relationship status: Not on file     Intimate partner violence:     Fear of current or ex partner: Not on file     Emotionally abused: Not on file     Physically abused: Not on file     Forced sexual activity: Not on file   Other Topics Concern     Not on file   Social History Narrative     Not on file     FAMILY HX  Anxiety  Depression    ROS:   CONSTITUTIONAL:POSITIVE  for  "weakness  INTEGUMENTARY/SKIN: POSITIVE for pale skin  ENT/MOUTH: NEGATIVE for ear, mouth and throat problems  RESP:NEGATIVE for significant cough or SOB  CV: NEGATIVE for chest pain, palpitations or peripheral edema  GI: NEGATIVE for nausea, abdominal pain, heartburn, or change in bowel habits  : POSITIVE for heavy menstrual cycles  MUSCULOSKELETAL: NEGATIVE for significant arthralgias or myalgia  VASC: NEGATIVE for cool extremities  NEURO: POSITIVE for dizziness and lightheadedness    OBJECTIVE:  BP 90/50 (BP Location: Left arm, Patient Position: Sitting, Cuff Size: Adult Regular)   Pulse 84   Temp 99  F (37.2  C) (Oral)   Resp 16   Ht 1.549 m (5' 1\")   Wt 52.6 kg (116 lb)   SpO2 100%   BMI 21.92 kg/m      GENERAL APPEARANCE: healthy, alert and no distress  HEENT: Normal TM bilaterally  NECK: Negative for neck tenderness  RESP Normal to auscultation  CV: regular rates and rhythm, normal S1 S2, no murmur noted  ABDOMEN:  soft, nontender, no HSM or masses and bowel sounds normal  BACK: No CVA tenderness  : Deferred  MS:  extremities normal- no gross deformities noted, no erythema, FROM noted in all extremities  SKIN: no suspicious lesions or rashes  NEURO: CN 2-12 grossly intact    Results for orders placed or performed in visit on 07/15/19   HCG Qual, Urine ([id number])   Result Value Ref Range    HCG Qual Urine Negative NEG^Negative   UA with Microscopic reflex to Culture   Result Value Ref Range    Color Urine Yellow     Appearance Urine Slightly Cloudy     Glucose Urine Negative NEG^Negative mg/dL    Bilirubin Urine Negative NEG^Negative    Ketones Urine Negative NEG^Negative mg/dL    Specific Gravity Urine 1.015 1.003 - 1.035    pH Urine 7.5 (H) 5.0 - 7.0 pH    Protein Albumin Urine Trace (A) NEG^Negative mg/dL    Urobilinogen Urine 0.2 0.2 - 1.0 EU/dL    Nitrite Urine Negative NEG^Negative    Blood Urine Large (A) NEG^Negative    Leukocyte Esterase Urine Negative NEG^Negative    Source Midstream Urine  "    WBC Urine 0 - 5 OTO5^0 - 5 /HPF    RBC Urine  (A) OTO2^O - 2 /HPF    Hyaline Casts 2-5 (A) OTO2^O - 2 /LPF    Squamous Epithelial /LPF Urine Many (A) FEW^Few /LPF    Bacteria Urine Few (A) NEG^Negative /HPF    Mucous Urine Present (A) NEG^Negative /LPF   CBC with platelets differential   Result Value Ref Range    WBC 8.1 4.0 - 11.0 10e9/L    RBC Count 2.88 (L) 3.8 - 5.2 10e12/L    Hemoglobin 4.2 (LL) 11.7 - 15.7 g/dL    Hematocrit 15.7 (L) 35.0 - 47.0 %    MCV 55 (L) 78 - 100 fl    MCH 14.6 (L) 26.5 - 33.0 pg    MCHC 26.8 (L) 31.5 - 36.5 g/dL    RDW 20.6 (H) 10.0 - 15.0 %    Platelet Count 436 150 - 450 10e9/L    % Neutrophils 75.7 %    % Lymphocytes 17.9 %    % Monocytes 4.2 %    % Eosinophils 1.1 %    % Basophils 1.1 %    Absolute Neutrophil 6.2 1.6 - 8.3 10e9/L    Absolute Lymphocytes 1.5 0.8 - 5.3 10e9/L    Absolute Monocytes 0.3 0.0 - 1.3 10e9/L    Absolute Eosinophils 0.1 0.0 - 0.7 10e9/L    Absolute Basophils 0.1 0.0 - 0.2 10e9/L    Diff Method Automated Method    Comprehensive metabolic panel   Result Value Ref Range    Sodium 139 133 - 144 mmol/L    Potassium 4.0 3.4 - 5.3 mmol/L    Chloride 109 94 - 109 mmol/L    Carbon Dioxide 26 20 - 32 mmol/L    Anion Gap 4 3 - 14 mmol/L    Glucose 111 (H) 70 - 99 mg/dL    Urea Nitrogen 11 7 - 30 mg/dL    Creatinine 0.56 0.52 - 1.04 mg/dL    GFR Estimate >90 >60 mL/min/[1.73_m2]    GFR Estimate If Black >90 >60 mL/min/[1.73_m2]    Calcium 8.2 (L) 8.5 - 10.1 mg/dL    Bilirubin Total 0.4 0.2 - 1.3 mg/dL    Albumin 3.4 3.4 - 5.0 g/dL    Protein Total 7.3 6.8 - 8.8 g/dL    Alkaline Phosphatase 49 40 - 150 U/L    ALT 16 0 - 50 U/L    AST 11 0 - 45 U/L   Iron and iron binding capacity   Result Value Ref Range    Iron 9 (L) 35 - 180 ug/dL    Iron Binding Cap 459 (H) 240 - 430 ug/dL    Iron Saturation Index 2 (L) 15 - 46 %   Ferritin   Result Value Ref Range    Ferritin <1 (L) 8 - 252 ng/mL         ASSESSMENT/PLAN:      ICD-10-CM    1. Menorrhagia with irregular cycle  N92.1 HCG Qual, Urine ([id number])     UA with Microscopic reflex to Culture     CBC with platelets differential     Comprehensive metabolic panel     Iron and iron binding capacity     Ferritin   2. Generalized muscle weakness M62.81 HCG Qual, Urine ([id number])     UA with Microscopic reflex to Culture     CBC with platelets differential     Comprehensive metabolic panel   3. Other fatigue R53.83 HCG Qual, Urine ([id number])     UA with Microscopic reflex to Culture     CBC with platelets differential     Comprehensive metabolic panel   4. Iron deficiency anemia due to chronic blood loss D50.0        CBC shows severe anemia likely due to blood loss, she will need a blood transfusion  Patient declines ambulance  She is being taken right to the ED via car with   ED was contacted, she will need admission

## 2019-07-16 NOTE — PROVIDER NOTIFICATION
MD Notification    Notified Person: MD    Notified Person Name: Dr. Shankar    Notification Date/Time: 7/15/19    Notification Interaction: Telephone    Purpose of Notification: Low BP, telemetry?    Orders Received: No new orders. Continue with PRBCs, fluids, and orthostatics. Use bedside commode until dizziness resolved.

## 2019-07-16 NOTE — PLAN OF CARE
Discharge    Patient discharged to home via   Care plan note: A&O x4. VSS on RA. Up SBA, steady and no dizziness/lightheadedness. Hgb 8.3 today. No s/s of bleeding. OBGYN saw patient, given IV iron and to have PO iron on discharge, plan for outpatient US and they will call patient to schedule. Discharge AVS reviewed, patient educated on new medication and when to seek medical attention; patient verbalized understanding and had no further questions. New medication sent home with patient. IV removed. All patient belongings accounted for and sent home with patient. Patient aware of follow ups to be made.     Listed belongings gathered and returned to patient. Yes  Care Plan and Patient education resolved: Yes  Prescriptions if needed, hard copies sent with patient  Yes  Home and hospital acquired medications returned to patient: NA  Medication Bin checked and emptied on discharge Yes  Follow up appointment made for patient: No- pt will make PCP and gyn calling patient

## 2019-07-16 NOTE — PLAN OF CARE
DATE & TIME: 7/16/19, 0700 - 1530            Cognitive Concerns/ Orientation : A&O x 4   BEHAVIOR & AGGRESSION TOOL COLOR: Green     ABNL VS/O2: VSS on room air  MOBILITY: SBA to BSC. Fall risk due to mild lightheadedness  PAIN MANAGMENT: Denied  DIET: Regular  BOWEL/BLADDER: Continent.  ABNL LAB/BG: Hgb 8.3 at 0830  DRAIN/DEVICES: PIV infusing NS at 100 ml/hr  SKIN: Intact  TESTS/PROCEDURES: OBGYN consult today  D/C DAY/GOALS/PLACE: Discharge pending progress  OTHER IMPORTANT INFO: Awaiting OB-GYN consult. Hgb was re-checked after 3rd unit of PRBCs previously 6.7 and now 8.3 with recheck. Vaginal bleeding is tapering, some spotting. Receiving IV iron and able to discharge after.

## 2019-07-16 NOTE — PROGRESS NOTES
[facility]    Hospitalist Progress Note    Assessment & Plan   Ann Parmar is a 49 year old female with PMHx of menorrhagia who was admitted on 7/15/2019 with acute blood loss anemia dt ongoing vaginal bleeding with hgb 4.2.    Severe Iron Deficiency and Acute Blood Loss Anemia dt Vaginal Bleeding   Initially presented to her PCP with complaints of generalized weakness, fatigue, headaches, and pre-syncope in the setting of 1-week history of very heavy vaginal bleeding beyond her typical menstrual periods. She was found to have a hemoglobin of 4.2. Iron studies on arrival were significant for severe iron deficiency with iron of 9 and iron sat 2%. Ferritin low (<1). Has hx of ectopic pregnancy and 2 prior normal vaginal deliveries. Urine pregnancy test on arrival was negative. Has a sister with a history of fibroids requiring surgery.   -- transfused 2U PRBCs on admission --> hgb to 6.7 this morning so was given an additional 1U PRBC, repeat hgb pending  -- OB/Gyn consulted -- defer plan for further imaging and treatment to their service     FEN: cont IVFs with NS@100ml/h, lytes stable, regular diet  DVT Prophylaxis: PCDs, ambulation  Code Status: Full Code    Disposition: Discharge date unclear, pending stable hgb and recs per ob/gyn.    Griselda Fraga    Interval History   Seen this morning. Resting comfortably. Bleeding has slowed but endorses ongoing spotting. No abd pain. No dizziness/lightheadedness. No cp/sob/cough, eating/drinking okay.    -Data reviewed today: I reviewed all new labs and imaging results over the last 24 hours. I personally reviewed no images or EKG's today.    Physical Exam   Temp: 97.9  F (36.6  C) Temp src: Oral BP: 101/64 Pulse: 66 Heart Rate: 63 Resp: 16 SpO2: 99 % O2 Device: None (Room air)    Vitals:    07/15/19 1527   Weight: 52.6 kg (116 lb)     Vital Signs with Ranges  Temp:  [97.8  F (36.6  C)-99  F (37.2  C)] 97.9  F (36.6  C)  Pulse:  [66-87]  66  Heart Rate:  [63-67] 63  Resp:  [16-18] 16  BP: ()/(45-69) 101/64  SpO2:  [99 %-100 %] 99 %  I/O last 3 completed shifts:  In: 1070 [P.O.:120]  Out: 1000 [Urine:1000]    Constitutional: Resting comfortably, alert and answering questions appropriately, NAD  Respiratory: CTAB, no wheeze/rales/rhonchi, no increased work of breathing  Cardiovascular: HRRR, no MGR, no LE edema  GI: S, NT, ND, +BS  Skin/Integumen: warm/dry  Other:      Medications     sodium chloride 100 mL/hr at 07/15/19 2241       sodium chloride (PF)  3 mL Intracatheter Q8H       Data   Recent Labs   Lab 07/16/19  0033 07/15/19  1420   WBC  --  8.1   HGB 6.7* 4.2*   MCV  --  55*   PLT  --  436   NA  --  139   POTASSIUM  --  4.0   CHLORIDE  --  109   CO2  --  26   BUN  --  11   CR  --  0.56   ANIONGAP  --  4   FAUSTO  --  8.2*   GLC  --  111*   ALBUMIN  --  3.4   PROTTOTAL  --  7.3   BILITOTAL  --  0.4   ALKPHOS  --  49   ALT  --  16   AST  --  11       No results found for this or any previous visit (from the past 24 hour(s)).

## 2019-12-04 ENCOUNTER — HOSPITAL ENCOUNTER (EMERGENCY)
Facility: CLINIC | Age: 49
Discharge: HOME | End: 2019-12-04
Attending: EMERGENCY MEDICINE | Admitting: EMERGENCY MEDICINE
Payer: COMMERCIAL

## 2019-12-04 ENCOUNTER — OFFICE VISIT (OUTPATIENT)
Dept: URGENT CARE | Facility: URGENT CARE | Age: 49
End: 2019-12-04
Payer: COMMERCIAL

## 2019-12-04 ENCOUNTER — APPOINTMENT (OUTPATIENT)
Dept: ULTRASOUND IMAGING | Facility: CLINIC | Age: 49
End: 2019-12-04
Attending: EMERGENCY MEDICINE
Payer: COMMERCIAL

## 2019-12-04 VITALS
HEIGHT: 61 IN | BODY MASS INDEX: 22.28 KG/M2 | OXYGEN SATURATION: 100 % | WEIGHT: 118 LBS | HEART RATE: 69 BPM | DIASTOLIC BLOOD PRESSURE: 59 MMHG | SYSTOLIC BLOOD PRESSURE: 97 MMHG | RESPIRATION RATE: 16 BRPM | TEMPERATURE: 98.4 F

## 2019-12-04 VITALS
WEIGHT: 122 LBS | BODY MASS INDEX: 23.83 KG/M2 | HEART RATE: 83 BPM | OXYGEN SATURATION: 100 % | DIASTOLIC BLOOD PRESSURE: 60 MMHG | RESPIRATION RATE: 18 BRPM | SYSTOLIC BLOOD PRESSURE: 98 MMHG | TEMPERATURE: 98.1 F

## 2019-12-04 DIAGNOSIS — D62 ANEMIA DUE TO BLOOD LOSS, ACUTE: Primary | ICD-10-CM

## 2019-12-04 DIAGNOSIS — D62 ANEMIA DUE TO BLOOD LOSS, ACUTE: ICD-10-CM

## 2019-12-04 DIAGNOSIS — R11.2 NAUSEA AND VOMITING, INTRACTABILITY OF VOMITING NOT SPECIFIED, UNSPECIFIED VOMITING TYPE: ICD-10-CM

## 2019-12-04 LAB
ABO + RH BLD: NORMAL
ABO + RH BLD: NORMAL
ANION GAP SERPL CALCULATED.3IONS-SCNC: 5 MMOL/L (ref 3–14)
B-HCG FREE SERPL-ACNC: <5 IU/L
BASOPHILS # BLD AUTO: 0.1 10E9/L (ref 0–0.2)
BASOPHILS NFR BLD AUTO: 0.7 %
BLD GP AB SCN SERPL QL: NORMAL
BLD PROD TYP BPU: NORMAL
BLD UNIT ID BPU: 0
BLD UNIT ID BPU: 0
BLOOD BANK CMNT PATIENT-IMP: NORMAL
BLOOD PRODUCT CODE: NORMAL
BLOOD PRODUCT CODE: NORMAL
BPU ID: NORMAL
BPU ID: NORMAL
BUN SERPL-MCNC: 16 MG/DL (ref 7–30)
CALCIUM SERPL-MCNC: 8.5 MG/DL (ref 8.5–10.1)
CHLORIDE SERPL-SCNC: 107 MMOL/L (ref 94–109)
CO2 SERPL-SCNC: 26 MMOL/L (ref 20–32)
CREAT SERPL-MCNC: 0.5 MG/DL (ref 0.52–1.04)
DIFFERENTIAL METHOD BLD: ABNORMAL
EOSINOPHIL # BLD AUTO: 0.1 10E9/L (ref 0–0.7)
EOSINOPHIL NFR BLD AUTO: 1.5 %
ERYTHROCYTE [DISTWIDTH] IN BLOOD BY AUTOMATED COUNT: 22.6 % (ref 10–15)
GFR SERPL CREATININE-BSD FRML MDRD: >90 ML/MIN/{1.73_M2}
GLUCOSE SERPL-MCNC: 124 MG/DL (ref 70–99)
HCT VFR BLD AUTO: 22.3 % (ref 35–47)
HGB BLD-MCNC: 6.4 G/DL (ref 11.7–15.7)
LYMPHOCYTES # BLD AUTO: 1.4 10E9/L (ref 0.8–5.3)
LYMPHOCYTES NFR BLD AUTO: 18.9 %
MCH RBC QN AUTO: 18.1 PG (ref 26.5–33)
MCHC RBC AUTO-ENTMCNC: 28.7 G/DL (ref 31.5–36.5)
MCV RBC AUTO: 63 FL (ref 78–100)
MONOCYTES # BLD AUTO: 0.4 10E9/L (ref 0–1.3)
MONOCYTES NFR BLD AUTO: 5.3 %
NEUTROPHILS # BLD AUTO: 5.6 10E9/L (ref 1.6–8.3)
NEUTROPHILS NFR BLD AUTO: 73.6 %
NUM BPU REQUESTED: 2
PLATELET # BLD AUTO: 612 10E9/L (ref 150–450)
POTASSIUM SERPL-SCNC: 4.1 MMOL/L (ref 3.4–5.3)
RBC # BLD AUTO: 3.54 10E12/L (ref 3.8–5.2)
SODIUM SERPL-SCNC: 138 MMOL/L (ref 133–144)
SPECIMEN EXP DATE BLD: NORMAL
TRANSFUSION STATUS PATIENT QL: NORMAL
WBC # BLD AUTO: 7.5 10E9/L (ref 4–11)

## 2019-12-04 PROCEDURE — 80048 BASIC METABOLIC PNL TOTAL CA: CPT | Performed by: PHYSICIAN ASSISTANT

## 2019-12-04 PROCEDURE — 36430 TRANSFUSION BLD/BLD COMPNT: CPT

## 2019-12-04 PROCEDURE — 86850 RBC ANTIBODY SCREEN: CPT | Performed by: EMERGENCY MEDICINE

## 2019-12-04 PROCEDURE — 99285 EMERGENCY DEPT VISIT HI MDM: CPT | Mod: 25

## 2019-12-04 PROCEDURE — 76830 TRANSVAGINAL US NON-OB: CPT

## 2019-12-04 PROCEDURE — 36415 COLL VENOUS BLD VENIPUNCTURE: CPT | Performed by: PHYSICIAN ASSISTANT

## 2019-12-04 PROCEDURE — 85025 COMPLETE CBC W/AUTO DIFF WBC: CPT | Performed by: PHYSICIAN ASSISTANT

## 2019-12-04 PROCEDURE — 86901 BLOOD TYPING SEROLOGIC RH(D): CPT | Performed by: EMERGENCY MEDICINE

## 2019-12-04 PROCEDURE — P9016 RBC LEUKOCYTES REDUCED: HCPCS | Performed by: EMERGENCY MEDICINE

## 2019-12-04 PROCEDURE — 86923 COMPATIBILITY TEST ELECTRIC: CPT | Performed by: EMERGENCY MEDICINE

## 2019-12-04 PROCEDURE — 86900 BLOOD TYPING SEROLOGIC ABO: CPT | Performed by: EMERGENCY MEDICINE

## 2019-12-04 PROCEDURE — 99215 OFFICE O/P EST HI 40 MIN: CPT | Performed by: PHYSICIAN ASSISTANT

## 2019-12-04 PROCEDURE — 84702 CHORIONIC GONADOTROPIN TEST: CPT

## 2019-12-04 RX ORDER — ONDANSETRON 4 MG/1
4 TABLET, ORALLY DISINTEGRATING ORAL ONCE
Status: COMPLETED | OUTPATIENT
Start: 2019-12-04 | End: 2019-12-04

## 2019-12-04 RX ADMIN — ONDANSETRON 4 MG: 4 TABLET, ORALLY DISINTEGRATING ORAL at 10:10

## 2019-12-04 ASSESSMENT — ENCOUNTER SYMPTOMS
VOMITING: 1
FATIGUE: 1
WEAKNESS: 1
LIGHT-HEADEDNESS: 1
ABDOMINAL PAIN: 0
NAUSEA: 1

## 2019-12-04 ASSESSMENT — MIFFLIN-ST. JEOR: SCORE: 1097.62

## 2019-12-04 NOTE — ED TRIAGE NOTES
"Pt was seen at the clinic today and noted to be anemic. She was sent in for a transfusion.    Pt states she had a heavy period this past Thanksgiving.  Feels weak, tired and \"out of it\".  H/O previous blood transfusion.  "

## 2019-12-04 NOTE — ED PROVIDER NOTES
"  History     Chief Complaint:  Anemia    HPI   Ann Parmar is a 49 year old female who presents to the emergency department today for evaluation of anemia. Per chart review, the patient was evaluated at urgent care this morning for one week of nausea and vomiting. At that time she also noted a history of severe anemia after heavy menses, adding that her last period was rather heavy, soaking through adult diapers and super absorbant tampons simultaneously, adding that they tapered off three days ago. She noted a history of similar symptoms in July of 2019, stating that she required a blood transfusion at that time, and added that she was planning to undergo hysterectomy in Spring 2020. During this visit she was found to be anemic (HGB 6.4) and was subsequently referred to the ED for evaluation and treatment.     Here the patient reports that she has been feeling weak, tired, \"out of it,\" light headed, faint, and nauseous and has been vomiting for the last week. She explains that she experiences extremely heavy periods approximately every three weeks. The patient denies abdominal pain.     Laboratory Studies: 12/4/19  CBC: WBC 7.5, HGB 6.4 (LL),   BMP: Glucose 124 (H), Creatinine 0.50 (L), o/w WNL     Allergies:  Iodine    Medications:    Medications reviewed. No current medications.     Past Medical History:    Dysmenorrhea  Labial cyst  Menorrhagia  Complication of breast implant  Migraine   Fibroids    Past Surgical History:    [place] tooth extraction  Ectopic pregnancy surgery  Breast augmentation    Family History:    Father: liver cancer     Social History:  The patient presents to the ED alone.  Patient has followed with [facility]  Smoking Status: Current Some Day Smoker  Smokeless Tobacco: Never Used  Alcohol Use: Negative  Drug Use: Negative  Marital Status:        Review of Systems   Constitutional: Positive for fatigue.        \"out of it\"   Gastrointestinal: Positive for nausea " "and vomiting. Negative for abdominal pain.   Neurological: Positive for syncope (near), weakness and light-headedness.   10 point review of systems performed and is negative except as above and in HPI.      Physical Exam     Patient Vitals for the past 24 hrs:   BP Temp Temp src Pulse Heart Rate Resp SpO2 Height Weight   12/04/19 1415 100/80 98.4  F (36.9  C) -- 72 -- 14 -- -- --   12/04/19 1407 97/59 98.5  F (36.9  C) -- 78 -- -- -- -- --   12/04/19 1315 -- 98.2  F (36.8  C) -- -- -- 14 -- -- --   12/04/19 1300 -- 98.1  F (36.7  C) -- -- -- 16 100 % -- --   12/04/19 1245 -- 98.1  F (36.7  C) -- -- -- 16 99 % -- --   12/04/19 1230 96/62 98  F (36.7  C) -- 75 -- 15 98 % -- --   12/04/19 1048 98/42 98.4  F (36.9  C) Oral -- 77 16 100 % 1.549 m (5' 1\") 53.5 kg (118 lb)     Physical Exam  General: Resting on the gurney, appears comfortable  Head:  The scalp, face, and head appear normal  Mouth/Throat: Mucus membranes are pale.  CV:  Regular rate    Normal S1 and S2  No pathological murmur   Resp:  Breath sounds clear and equal bilaterally    Non-labored, no retractions or accessory muscle use    No coarseness    No wheezing   GI:  Abdomen is soft, no rigidity    No tenderness to palpation, no guarding, no rebound.  MS:  Normal motor assessment of all extremities.    Good capillary refill noted.  Skin:  Notably pale. No rash or lesions noted.  Neuro: Speech is normal and fluent. No apparent deficit.  Psych:  Awake. Alert.  Normal affect.      Appropriate interactions.    Emergency Department Course     Imaging:  Radiology findings were communicated with the patient who voiced understanding of the findings.    US Pelvic Complete with Transvaginal  Multiple uterine fibroids as described above. Thickened endometrium which may be due to secretory phase of the menstrual cycle.  Reading per radiology    Laboratory:  Laboratory findings were communicated with the patient who voiced understanding of the findings.    ABO/Rh Type " and Screen: B+, Antibody Screen negative  ISTAT HCG: <5.0    Emergency Department Course:    1055 Nursing notes and vitals reviewed.    1101 I performed an exam of the patient as documented above.     1125 ISTAT HCG obtained as noted above.    1126 IV was inserted and blood was drawn for laboratory testing, results above.    1144 The patient was sent for a pelvic ultrasound while in the emergency department, results above.     1156 I spoke with Dr. Linn of the OBGYN service from [facility] regarding patient's presentation, findings, and plan of care.    1219 [facility] noted that they have an appointment scheduled for 12/10/19 for the patient.     1230 First unit blood hung.     1415 Second unit blood hung.     Findings and plan explained to the patient. Patient discharged home with instructions regarding supportive care, medications, and reasons to return. The importance of close follow-up was reviewed.    Impression & Plan      Medical Decision Making:  Ann Parmar is a 49 year old female who presents for evaluation of anemia found at urgent care this morning. She has a longstanding history of severe anemia with heavy menses and has required a blood transfusion for treatment historically. Her last period began around Thanksgiving, and bleeding ceased three days ago. The patient has had symptoms of weakness, fatigue, light headedness, near syncope, nausea, and vomiting for the last week.  In the ED labs were not rechecked.  The patient was treated with two units of red blood cells.  The patient does not require inpatient treatment for this.  I discussed the case with [facility] who feel that the patient is safe for discharge given no active bleeding and have scheduled a follow up appointment for 12/10. I agree with this plan, as does the patient. I discussed reasons to return to the ED and the importance of close follow up. The patient verbalized understanding. Patient discharged in stable  condition.     Diagnosis:    ICD-10-CM    1. Anemia due to blood loss, acute D62 ABO/Rh type and screen     Blood component     Blood component     Disposition:   The patient is discharged to home.    Scribe Disclosure:  Cydney MAGDALENO, am serving as a scribe at 10:56 AM on 12/4/2019 to document services personally performed by Luz West MD based on my observations and the provider's statements to me.     EMERGENCY DEPARTMENT       Luz West MD  12/05/19 [id number]

## 2019-12-04 NOTE — PROGRESS NOTES
Clinic Administered Medication Documentation    MEDICATION LIST: Oral Medication Documentation    Patient was given Ondansetron (Zofran) . Prior to medication administration, verified patients identity using patient s name and date of birth. Please see MAR and medication order for additional information.     Was entire amount of medication used? Yes

## 2019-12-04 NOTE — PROGRESS NOTES
"Patient presents with:  Urgent Care: dizziness, lightheaded, and nausea for one week    SUBJECTIVE:  Chief Complaint   Patient presents with     Urgent Care     dizziness, lightheaded, and nausea for one week     Ann Parmar is a 49 year old female whose symptoms began:  1) nausea for the past week, vomiting a couple of times a day  No diarrhea.    Feeling dizzy and short of breath.  No cough or fevers.    Does have a h/o fibroids and heavy bleeding.  Has had trouble with severe anemia after heavy menses.   Bleeding from her last menses tapered off 3 days ago.  The bleeding was very heavy, she was soaking through adult diapers and super absorbant tampons used simultaneously.    She had a transfusion in July 2019 for similar symptoms.  Feeling \"almost\" as bad today.    Planning to have hysterectomy in Spring 2020.    Taking iron, but getting more nausea with them      No past medical history on file..    PMH:  Fibroids.      No current outpatient medications on file.     Social History     Tobacco Use     Smoking status: Light Tobacco Smoker     Smokeless tobacco: Never Used   Substance Use Topics     Alcohol use: Never     Frequency: Never       ROS:  CONSTITUTIONAL:as per HPI  INTEGUMENTARY/SKIN: NEGATIVE for worrisome rashes, moles or lesions  EYES: NEGATIVE for vision changes or irritation  ENT/MOUTH: NEGATIVE for ear, mouth and throat problems  RESP:NEGATIVE for significant cough or SOB  CV: NEGATIVE for chest pain, palpitations or peripheral edema  : as per HPI  GI: as per HPI  MUSCULOSKELETAL: NEGATIVE  NEURO: NEGATIVE for weakness, dizziness or paresthesias  ENDOCRINE: NEGATIVE for temperature intolerance, skin/hair changes  Review of systems negative except as stated above.    OBJECTIVE:  BP 98/60   Pulse 83   Temp 98.1  F (36.7  C) (Tympanic)   Resp 18   Wt 55.3 kg (122 lb)   SpO2 100%   BMI 23.83 kg/m    GENERAL APPEARANCE: healthy, alert and no distress  EYES: EOMI,  PERRL, conjunctiva " clear.  Palpebral conjunctivae are pale  HENT: ear canals and TM's normal.  Nose and mouth without ulcers, erythema or lesions  NECK: supple, nontender, no lymphadenopathy  RESP: lungs clear to auscultation - no rales, rhonchi or wheezes  CV: regular rates and rhythm, normal S1 S2, no murmur noted  ABDOMEN:  soft, nontender, no HSM or masses and bowel sounds normal  NEURO: Normal strength and tone, sensory exam grossly normal,  normal speech and mentation  SKIN: no suspicious lesions or rashes    (D62) Anemia due to blood loss, acute  (primary encounter diagnosis)  Comment: symptomatic   Plan: to ED now for treatment.      (R11.2) Nausea and vomiting, intractability of vomiting not specified, unspecified vomiting type  Comment:   Plan: CBC with platelets and differential, Basic         metabolic panel  (Ca, Cl, CO2, Creat, Gluc, K,         Na, BUN), ondansetron (ZOFRAN-ODT) ODT tab 4 mg            ED notified.  Her  will drive her.

## 2019-12-04 NOTE — PATIENT INSTRUCTIONS
(D62) Anemia due to blood loss, acute  (primary encounter diagnosis)  Comment: symptomatic   Plan: to ED now for treatment.      (R11.2) Nausea and vomiting, intractability of vomiting not specified, unspecified vomiting type  Comment:   Plan: CBC with platelets and differential, Basic         metabolic panel  (Ca, Cl, CO2, Creat, Gluc, K,         Na, BUN), ondansetron (ZOFRAN-ODT) ODT tab 4 mg

## 2019-12-04 NOTE — ED AVS SNAPSHOT
Emergency Department  [address] [place] [address]  Phone:  [phone]  Fax:  [phone]                                    Ann Parmar   MRN: [id number]    Department:   Emergency Department   Date of Visit:  12/4/2019           After Visit Summary Signature Page    I have received my discharge instructions, and my questions have been answered. I have discussed any challenges I see with this plan with the nurse or doctor.    ..........................................................................................................................................  Patient/Patient Representative Signature      ..........................................................................................................................................  Patient Representative Print Name and Relationship to Patient    ..................................................               ................................................  Date                                   Time    ..........................................................................................................................................  Reviewed by Signature/Title    ...................................................              ..............................................  Date                                               Time          22EPIC Rev 08/18

## 2019-12-04 NOTE — LETTER
December 4, 2019      To Whom It May Concern:      Ann Parmar was seen in our Emergency Department today, 12/04/19.  I expect her condition to improve over the next day. She may return to work when her symptoms have improved.  Please excuse Ann 12/3/19-12/4/19 and Monday 12/9/19 .    Sincerely,        Nishant Chris RN

## 2020-02-16 ENCOUNTER — HEALTH MAINTENANCE LETTER (OUTPATIENT)
Age: 50
End: 2020-02-16

## 2020-08-05 ENCOUNTER — OFFICE VISIT (OUTPATIENT)
Dept: URGENT CARE | Facility: URGENT CARE | Age: 50
End: 2020-08-05
Payer: COMMERCIAL

## 2020-08-05 VITALS
WEIGHT: 120 LBS | DIASTOLIC BLOOD PRESSURE: 70 MMHG | BODY MASS INDEX: 22.66 KG/M2 | OXYGEN SATURATION: 100 % | HEART RATE: 76 BPM | HEIGHT: 61 IN | SYSTOLIC BLOOD PRESSURE: 105 MMHG | TEMPERATURE: 98.7 F | RESPIRATION RATE: 16 BRPM

## 2020-08-05 DIAGNOSIS — R06.02 SOB (SHORTNESS OF BREATH): Primary | ICD-10-CM

## 2020-08-05 DIAGNOSIS — D62 ANEMIA DUE TO BLOOD LOSS, ACUTE: ICD-10-CM

## 2020-08-05 LAB
BASOPHILS # BLD AUTO: 0.1 10E9/L (ref 0–0.2)
BASOPHILS NFR BLD AUTO: 2 %
DIFFERENTIAL METHOD BLD: ABNORMAL
EOSINOPHIL # BLD AUTO: 0.2 10E9/L (ref 0–0.7)
EOSINOPHIL NFR BLD AUTO: 3.2 %
ERYTHROCYTE [DISTWIDTH] IN BLOOD BY AUTOMATED COUNT: 24 % (ref 10–15)
HCT VFR BLD AUTO: 21.4 % (ref 35–47)
HGB BLD-MCNC: 5.8 G/DL (ref 11.7–15.7)
LYMPHOCYTES # BLD AUTO: 1.4 10E9/L (ref 0.8–5.3)
LYMPHOCYTES NFR BLD AUTO: 20.9 %
MCH RBC QN AUTO: 15 PG (ref 26.5–33)
MCHC RBC AUTO-ENTMCNC: 27.1 G/DL (ref 31.5–36.5)
MCV RBC AUTO: 55 FL (ref 78–100)
MONOCYTES # BLD AUTO: 0.3 10E9/L (ref 0–1.3)
MONOCYTES NFR BLD AUTO: 5.1 %
NEUTROPHILS # BLD AUTO: 4.6 10E9/L (ref 1.6–8.3)
NEUTROPHILS NFR BLD AUTO: 68.8 %
PLATELET # BLD AUTO: 388 10E9/L (ref 150–450)
RBC # BLD AUTO: 3.86 10E12/L (ref 3.8–5.2)
WBC # BLD AUTO: 6.5 10E9/L (ref 4–11)

## 2020-08-05 PROCEDURE — 99215 OFFICE O/P EST HI 40 MIN: CPT | Performed by: PHYSICIAN ASSISTANT

## 2020-08-05 PROCEDURE — 36415 COLL VENOUS BLD VENIPUNCTURE: CPT | Performed by: PHYSICIAN ASSISTANT

## 2020-08-05 PROCEDURE — 85025 COMPLETE CBC W/AUTO DIFF WBC: CPT | Performed by: PHYSICIAN ASSISTANT

## 2020-08-05 ASSESSMENT — MIFFLIN-ST. JEOR: SCORE: 1101.7

## 2020-08-05 NOTE — PROGRESS NOTES
"SUBJECTIVE:  Ann is a 50 year old female who has a history of anemia due to blood loss secondary to heavy uterine bleeding.  She has fibroids that were scheduled to be removed this last spring but was rescheduled to October of this year due to COVID-19.  Patient has needed blood transfusion July 2019, December 2019 previously.  Her hemoglobin at that time was 6.4.  Presents to urgent care with similar symptoms that she has presented with for this issue before.  She has shortness of breath, presyncope, nausea with standing up, headache and 3 to 4 days of joint pain.  Her last menstrual period was 6 days ago and excessively heavy.  She is not actively bleeding now.  She denies any sore throat, cough, wheeze, chest pain, chest tightness, vomiting, muscle aches or fever.    Chief Complaint   Patient presents with     Shortness of Breath     SOB, feels \"fainty\", nausea, bad migraine, doesn't feel good, joints hurt x 3-4 days - h/o excessive menstrual bleeding - not heavy right now - LPM: 07/30/20     Letter for School/Work     has missed 3 days of work - needs note      ROS: See HPI    No current outpatient medications on file.     No current facility-administered medications for this visit.       Patient Active Problem List   Diagnosis     Acute blood loss anemia        No past medical history on file.  No past surgical history on file.  No family history on file.  Social History     Tobacco Use     Smoking status: Current Some Day Smoker     Smokeless tobacco: Never Used     Tobacco comment: social smoker and has smoke exposure   Substance Use Topics     Alcohol use: Never     Frequency: Never        OBJECTIVE:  Ht 1.549 m (5' 1\")   Wt 54.4 kg (120 lb)   LMP 07/30/2020 (LMP Unknown)   Breastfeeding No   BMI 22.67 kg/m       GENERAL APPEARANCE:  alert and no distress     EYES: EOMI,     HENT:  nose and mouth without ulcers or lesions     NECK: Supple, full range of motion     RESP: lungs clear to auscultation - " no rales, rhonchi or wheezes     CV: regular rates and rhythm, normal S1 S2, no S3 or S4 and no murmur, click or rub -     MS: extremities normal- no gross deformities noted, no evidence of inflammation in joints, FROM in all extremities.     SKIN: no suspicious lesions or rashes, mild pallor     NEURO: Normal strength and tone, sensory exam grossly normal, mentation intact and speech normal     PSYCH: mentation appears normal. and affect normal/bright    DIAGNOSTICS    No results found for any visits on 08/05/20.     ASSESSMENT/PLAN:  Ann was seen today for shortness of breath and letter for school/work.    Diagnoses and all orders for this visit:    SOB (shortness of breath)  -     CBC with platelets and differential    Anemia due to blood loss, acute    Current hemoglobin was 5.7 on preliminary count.  Patient was recommended to go to ER for blood transfusion.  Patient would like to go to [facility] and I called that ER and gave report.    The plan of care was discussed with the patient. They understand and agree with the course of treatment prescribed. A printed summary was given including instructions and medications.    Manuel Robison PA-C

## 2020-08-05 NOTE — PATIENT INSTRUCTIONS
Go to the ER for transfusion    [facility]  [address] [facility], [place] [address]

## 2020-08-05 NOTE — LETTER
[place] [facility]  [address] [address]  [phone]      August 5, 2020    RE:  Ann Parmar                                                                                                                                                       [address]  [facility] [address]            To whom it may concern:    Ann Parmar is under my professional care. Please excuse her from work.          Sincerely,        Manuel Robison PA-C    [place] [facility]

## 2020-11-29 ENCOUNTER — HEALTH MAINTENANCE LETTER (OUTPATIENT)
Age: 50
End: 2020-11-29

## 2021-04-10 ENCOUNTER — HEALTH MAINTENANCE LETTER (OUTPATIENT)
Age: 51
End: 2021-04-10

## 2021-09-19 ENCOUNTER — HEALTH MAINTENANCE LETTER (OUTPATIENT)
Age: 51
End: 2021-09-19

## 2022-05-01 ENCOUNTER — HEALTH MAINTENANCE LETTER (OUTPATIENT)
Age: 52
End: 2022-05-01

## 2022-11-21 ENCOUNTER — HEALTH MAINTENANCE LETTER (OUTPATIENT)
Age: 52
End: 2022-11-21

## 2023-03-30 ENCOUNTER — LAB REQUISITION (OUTPATIENT)
Dept: LAB | Facility: CLINIC | Age: 53
End: 2023-03-30

## 2023-03-30 ENCOUNTER — LAB REQUISITION (OUTPATIENT)
Dept: LAB | Facility: CLINIC | Age: 53
End: 2023-03-30
Payer: COMMERCIAL

## 2023-03-30 ENCOUNTER — TRANSFERRED RECORDS (OUTPATIENT)
Dept: HEALTH INFORMATION MANAGEMENT | Facility: CLINIC | Age: 53
End: 2023-03-30

## 2023-03-30 DIAGNOSIS — N93.9 ABNORMAL UTERINE AND VAGINAL BLEEDING, UNSPECIFIED: ICD-10-CM

## 2023-03-30 DIAGNOSIS — D50.0 IRON DEFICIENCY ANEMIA SECONDARY TO BLOOD LOSS (CHRONIC): ICD-10-CM

## 2023-03-30 DIAGNOSIS — Z13.1 ENCOUNTER FOR SCREENING FOR DIABETES MELLITUS: ICD-10-CM

## 2023-03-30 LAB
ALBUMIN SERPL BCG-MCNC: 4.3 G/DL (ref 3.5–5.2)
ALP SERPL-CCNC: 66 U/L (ref 35–104)
ALT SERPL W P-5'-P-CCNC: 16 U/L (ref 10–35)
ANION GAP SERPL CALCULATED.3IONS-SCNC: 11 MMOL/L (ref 7–15)
AST SERPL W P-5'-P-CCNC: 22 U/L (ref 10–35)
BASOPHILS # BLD AUTO: 0.1 10E3/UL (ref 0–0.2)
BASOPHILS NFR BLD AUTO: 1 %
BILIRUB SERPL-MCNC: 0.3 MG/DL
BUN SERPL-MCNC: 15.8 MG/DL (ref 6–20)
CALCIUM SERPL-MCNC: 9.4 MG/DL (ref 8.6–10)
CHLORIDE SERPL-SCNC: 105 MMOL/L (ref 98–107)
CREAT SERPL-MCNC: 0.5 MG/DL (ref 0.51–0.95)
DEPRECATED HCO3 PLAS-SCNC: 24 MMOL/L (ref 22–29)
EOSINOPHIL # BLD AUTO: 0.2 10E3/UL (ref 0–0.7)
EOSINOPHIL NFR BLD AUTO: 3 %
ERYTHROCYTE [DISTWIDTH] IN BLOOD BY AUTOMATED COUNT: 24.4 % (ref 10–15)
FERRITIN SERPL-MCNC: 8 NG/ML (ref 11–328)
GFR SERPL CREATININE-BSD FRML MDRD: >90 ML/MIN/1.73M2
GLUCOSE SERPL-MCNC: 91 MG/DL (ref 70–99)
HBA1C MFR BLD: 5.9 %
HCT VFR BLD AUTO: 31.9 % (ref 35–47)
HGB BLD-MCNC: 8.2 G/DL (ref 11.7–15.7)
HOLD SPECIMEN: NORMAL
IMM GRANULOCYTES # BLD: 0 10E3/UL
IMM GRANULOCYTES NFR BLD: 0 %
IRON SERPL-MCNC: 13 UG/DL (ref 37–145)
LYMPHOCYTES # BLD AUTO: 2.1 10E3/UL (ref 0.8–5.3)
LYMPHOCYTES NFR BLD AUTO: 31 %
MCH RBC QN AUTO: 14.7 PG (ref 26.5–33)
MCHC RBC AUTO-ENTMCNC: 25.7 G/DL (ref 31.5–36.5)
MCV RBC AUTO: 57 FL (ref 78–100)
MONOCYTES # BLD AUTO: 0.4 10E3/UL (ref 0–1.3)
MONOCYTES NFR BLD AUTO: 6 %
NEUTROPHILS # BLD AUTO: 3.8 10E3/UL (ref 1.6–8.3)
NEUTROPHILS NFR BLD AUTO: 59 %
NRBC # BLD AUTO: 0 10E3/UL
NRBC BLD AUTO-RTO: 0 /100
PLAT MORPH BLD: ABNORMAL
PLATELET # BLD AUTO: 426 10E3/UL (ref 150–450)
POLYCHROMASIA BLD QL SMEAR: SLIGHT
POTASSIUM SERPL-SCNC: 4.2 MMOL/L (ref 3.4–5.3)
PROT SERPL-MCNC: 7.8 G/DL (ref 6.4–8.3)
RBC # BLD AUTO: 5.56 10E6/UL (ref 3.8–5.2)
RBC MORPH BLD: ABNORMAL
SODIUM SERPL-SCNC: 140 MMOL/L (ref 136–145)
TRANSFERRIN SERPL-MCNC: 361 MG/DL (ref 200–360)
TSH SERPL DL<=0.005 MIU/L-ACNC: 1.45 UIU/ML (ref 0.3–4.2)
WBC # BLD AUTO: 6.6 10E3/UL (ref 4–11)

## 2023-03-30 PROCEDURE — G0145 SCR C/V CYTO,THINLAYER,RESCR: HCPCS | Mod: ORL | Performed by: OBSTETRICS & GYNECOLOGY

## 2023-03-30 PROCEDURE — 83540 ASSAY OF IRON: CPT | Mod: ORL | Performed by: OBSTETRICS & GYNECOLOGY

## 2023-03-30 PROCEDURE — 85025 COMPLETE CBC W/AUTO DIFF WBC: CPT | Mod: ORL | Performed by: OBSTETRICS & GYNECOLOGY

## 2023-03-30 PROCEDURE — 83036 HEMOGLOBIN GLYCOSYLATED A1C: CPT | Mod: ORL | Performed by: OBSTETRICS & GYNECOLOGY

## 2023-03-30 PROCEDURE — 82728 ASSAY OF FERRITIN: CPT | Mod: ORL | Performed by: OBSTETRICS & GYNECOLOGY

## 2023-03-30 PROCEDURE — 80053 COMPREHEN METABOLIC PANEL: CPT | Mod: ORL | Performed by: OBSTETRICS & GYNECOLOGY

## 2023-03-30 PROCEDURE — 84443 ASSAY THYROID STIM HORMONE: CPT | Mod: ORL | Performed by: OBSTETRICS & GYNECOLOGY

## 2023-03-30 PROCEDURE — 87624 HPV HI-RISK TYP POOLED RSLT: CPT | Mod: ORL | Performed by: OBSTETRICS & GYNECOLOGY

## 2023-03-30 PROCEDURE — 88305 TISSUE EXAM BY PATHOLOGIST: CPT | Performed by: PATHOLOGY

## 2023-03-30 PROCEDURE — 84466 ASSAY OF TRANSFERRIN: CPT | Mod: ORL | Performed by: OBSTETRICS & GYNECOLOGY

## 2023-04-03 LAB
BKR LAB AP GYN ADEQUACY: NORMAL
BKR LAB AP GYN INTERPRETATION: NORMAL
BKR LAB AP HPV REFLEX: NORMAL
BKR LAB AP LMP: NORMAL
BKR LAB AP PREVIOUS ABNL DX: NORMAL
BKR LAB AP PREVIOUS ABNORMAL: NORMAL
PATH REPORT.COMMENTS IMP SPEC: NORMAL
PATH REPORT.FINAL DX SPEC: NORMAL
PATH REPORT.GROSS SPEC: NORMAL
PATH REPORT.MICROSCOPIC SPEC OTHER STN: NORMAL
PATH REPORT.RELEVANT HX SPEC: NORMAL
PATH REPORT.RELEVANT HX SPEC: NORMAL
PHOTO IMAGE: NORMAL

## 2023-04-04 LAB
HUMAN PAPILLOMA VIRUS 16 DNA: NEGATIVE
HUMAN PAPILLOMA VIRUS 18 DNA: NEGATIVE
HUMAN PAPILLOMA VIRUS FINAL DIAGNOSIS: NORMAL
HUMAN PAPILLOMA VIRUS OTHER HR: NEGATIVE

## 2023-04-06 DIAGNOSIS — T45.4X5A ADVERSE EFFECT OF IRON: ICD-10-CM

## 2023-04-06 DIAGNOSIS — D50.0 IRON DEFICIENCY ANEMIA SECONDARY TO BLOOD LOSS (CHRONIC): ICD-10-CM

## 2023-04-06 RX ORDER — MEPERIDINE HYDROCHLORIDE 25 MG/ML
25 INJECTION INTRAMUSCULAR; INTRAVENOUS; SUBCUTANEOUS EVERY 30 MIN PRN
Status: CANCELLED | OUTPATIENT
Start: 2023-04-17

## 2023-04-06 RX ORDER — HEPARIN SODIUM,PORCINE 10 UNIT/ML
5 VIAL (ML) INTRAVENOUS
Status: CANCELLED | OUTPATIENT
Start: 2023-04-17

## 2023-04-06 RX ORDER — EPINEPHRINE 1 MG/ML
0.3 INJECTION, SOLUTION INTRAMUSCULAR; SUBCUTANEOUS EVERY 5 MIN PRN
Status: CANCELLED | OUTPATIENT
Start: 2023-04-17

## 2023-04-06 RX ORDER — ALBUTEROL SULFATE 0.83 MG/ML
2.5 SOLUTION RESPIRATORY (INHALATION)
Status: CANCELLED | OUTPATIENT
Start: 2023-04-17

## 2023-04-06 RX ORDER — ALBUTEROL SULFATE 90 UG/1
1-2 AEROSOL, METERED RESPIRATORY (INHALATION)
Status: CANCELLED
Start: 2023-04-17

## 2023-04-06 RX ORDER — DIPHENHYDRAMINE HYDROCHLORIDE 50 MG/ML
50 INJECTION INTRAMUSCULAR; INTRAVENOUS
Status: CANCELLED
Start: 2023-04-17

## 2023-04-06 RX ORDER — HEPARIN SODIUM (PORCINE) LOCK FLUSH IV SOLN 100 UNIT/ML 100 UNIT/ML
5 SOLUTION INTRAVENOUS
Status: CANCELLED | OUTPATIENT
Start: 2023-04-17

## 2023-04-06 RX ORDER — METHYLPREDNISOLONE SODIUM SUCCINATE 125 MG/2ML
125 INJECTION, POWDER, LYOPHILIZED, FOR SOLUTION INTRAMUSCULAR; INTRAVENOUS
Status: CANCELLED
Start: 2023-04-17

## 2023-04-11 ENCOUNTER — INFUSION THERAPY VISIT (OUTPATIENT)
Dept: INFUSION THERAPY | Facility: CLINIC | Age: 53
End: 2023-04-11
Attending: OBSTETRICS & GYNECOLOGY
Payer: COMMERCIAL

## 2023-04-11 VITALS
DIASTOLIC BLOOD PRESSURE: 64 MMHG | HEART RATE: 63 BPM | OXYGEN SATURATION: 99 % | TEMPERATURE: 98.6 F | SYSTOLIC BLOOD PRESSURE: 121 MMHG

## 2023-04-11 DIAGNOSIS — T45.4X5A ADVERSE EFFECT OF IRON: ICD-10-CM

## 2023-04-11 DIAGNOSIS — D50.0 IRON DEFICIENCY ANEMIA SECONDARY TO BLOOD LOSS (CHRONIC): Primary | ICD-10-CM

## 2023-04-11 PROCEDURE — 250N000011 HC RX IP 250 OP 636: Performed by: OBSTETRICS & GYNECOLOGY

## 2023-04-11 PROCEDURE — 96365 THER/PROPH/DIAG IV INF INIT: CPT

## 2023-04-11 PROCEDURE — 96366 THER/PROPH/DIAG IV INF ADDON: CPT

## 2023-04-11 PROCEDURE — 258N000003 HC RX IP 258 OP 636: Performed by: OBSTETRICS & GYNECOLOGY

## 2023-04-11 RX ORDER — FERROUS SULFATE 325(65) MG
1 TABLET ORAL
COMMUNITY
Start: 2023-03-31

## 2023-04-11 RX ORDER — HEPARIN SODIUM,PORCINE 10 UNIT/ML
5 VIAL (ML) INTRAVENOUS
Status: CANCELLED | OUTPATIENT
Start: 2023-04-13

## 2023-04-11 RX ORDER — MEPERIDINE HYDROCHLORIDE 25 MG/ML
25 INJECTION INTRAMUSCULAR; INTRAVENOUS; SUBCUTANEOUS EVERY 30 MIN PRN
Status: CANCELLED | OUTPATIENT
Start: 2023-04-13

## 2023-04-11 RX ORDER — EPINEPHRINE 1 MG/ML
0.3 INJECTION, SOLUTION INTRAMUSCULAR; SUBCUTANEOUS EVERY 5 MIN PRN
Status: CANCELLED | OUTPATIENT
Start: 2023-04-13

## 2023-04-11 RX ORDER — ALBUTEROL SULFATE 90 UG/1
1-2 AEROSOL, METERED RESPIRATORY (INHALATION)
Status: CANCELLED
Start: 2023-04-13

## 2023-04-11 RX ORDER — ALBUTEROL SULFATE 0.83 MG/ML
2.5 SOLUTION RESPIRATORY (INHALATION)
Status: CANCELLED | OUTPATIENT
Start: 2023-04-13

## 2023-04-11 RX ORDER — HEPARIN SODIUM (PORCINE) LOCK FLUSH IV SOLN 100 UNIT/ML 100 UNIT/ML
5 SOLUTION INTRAVENOUS
Status: CANCELLED | OUTPATIENT
Start: 2023-04-13

## 2023-04-11 RX ORDER — MEDROXYPROGESTERONE ACETATE 10 MG
1 TABLET ORAL
Status: ON HOLD | COMMUNITY
Start: 2023-03-31 | End: 2023-04-19

## 2023-04-11 RX ORDER — METHYLPREDNISOLONE SODIUM SUCCINATE 125 MG/2ML
125 INJECTION, POWDER, LYOPHILIZED, FOR SOLUTION INTRAMUSCULAR; INTRAVENOUS
Status: CANCELLED
Start: 2023-04-13

## 2023-04-11 RX ORDER — DIPHENHYDRAMINE HYDROCHLORIDE 50 MG/ML
50 INJECTION INTRAMUSCULAR; INTRAVENOUS
Status: CANCELLED
Start: 2023-04-13

## 2023-04-11 RX ADMIN — IRON SUCROSE 300 MG: 20 INJECTION, SOLUTION INTRAVENOUS at 12:31

## 2023-04-11 NOTE — PROGRESS NOTES
Infusion Nursing Note:  Ann ANDUJAR Gilmay presents today for 1 of 3 venofer.    Patient seen by provider today: No   present during visit today: Not Applicable.    Note: Verbal education provided to patient and/or family regarding possible side effects.    Intravenous Access:  Peripheral IV placed.    Treatment Conditions:  Iron/ferritin results reviewed.    Post Infusion Assessment:  Patient tolerated infusion without incident.  Blood return noted pre and post infusion.  Site patent and intact, free from redness, edema or discomfort.  No evidence of extravasations.  Access discontinued per protocol.     Educated to contact provider if any abnormal side effects occur at home after leaving the clinic.     Discharge Plan:   Discharge instructions reviewed with: Patient.  Patient and/or family verbalized understanding of discharge instructions and all questions answered.  AVS to patient via [employer].  Patient will return 4/14/23 for next appointment.   Patient discharged in stable condition accompanied by: self.  Departure Mode: Ambulatory.      Hardy Lemus RN

## 2023-04-14 ENCOUNTER — INFUSION THERAPY VISIT (OUTPATIENT)
Dept: INFUSION THERAPY | Facility: CLINIC | Age: 53
End: 2023-04-14
Attending: OBSTETRICS & GYNECOLOGY
Payer: COMMERCIAL

## 2023-04-14 VITALS
HEART RATE: 74 BPM | SYSTOLIC BLOOD PRESSURE: 104 MMHG | OXYGEN SATURATION: 98 % | DIASTOLIC BLOOD PRESSURE: 70 MMHG | TEMPERATURE: 98.6 F | RESPIRATION RATE: 16 BRPM

## 2023-04-14 DIAGNOSIS — D50.0 IRON DEFICIENCY ANEMIA SECONDARY TO BLOOD LOSS (CHRONIC): Primary | ICD-10-CM

## 2023-04-14 DIAGNOSIS — T45.4X5D ADVERSE EFFECT OF IRON, SUBSEQUENT ENCOUNTER: ICD-10-CM

## 2023-04-14 PROCEDURE — 96365 THER/PROPH/DIAG IV INF INIT: CPT

## 2023-04-14 PROCEDURE — 250N000011 HC RX IP 250 OP 636: Performed by: OBSTETRICS & GYNECOLOGY

## 2023-04-14 PROCEDURE — 258N000003 HC RX IP 258 OP 636: Performed by: OBSTETRICS & GYNECOLOGY

## 2023-04-14 RX ORDER — EPINEPHRINE 1 MG/ML
0.3 INJECTION, SOLUTION INTRAMUSCULAR; SUBCUTANEOUS EVERY 5 MIN PRN
Status: CANCELLED | OUTPATIENT
Start: 2023-04-15

## 2023-04-14 RX ORDER — HEPARIN SODIUM (PORCINE) LOCK FLUSH IV SOLN 100 UNIT/ML 100 UNIT/ML
5 SOLUTION INTRAVENOUS
Status: CANCELLED | OUTPATIENT
Start: 2023-04-15

## 2023-04-14 RX ORDER — ALBUTEROL SULFATE 0.83 MG/ML
2.5 SOLUTION RESPIRATORY (INHALATION)
Status: CANCELLED | OUTPATIENT
Start: 2023-04-15

## 2023-04-14 RX ORDER — MEPERIDINE HYDROCHLORIDE 25 MG/ML
25 INJECTION INTRAMUSCULAR; INTRAVENOUS; SUBCUTANEOUS EVERY 30 MIN PRN
Status: CANCELLED | OUTPATIENT
Start: 2023-04-15

## 2023-04-14 RX ORDER — METHYLPREDNISOLONE SODIUM SUCCINATE 125 MG/2ML
125 INJECTION, POWDER, LYOPHILIZED, FOR SOLUTION INTRAMUSCULAR; INTRAVENOUS
Status: CANCELLED
Start: 2023-04-15

## 2023-04-14 RX ORDER — DIPHENHYDRAMINE HYDROCHLORIDE 50 MG/ML
50 INJECTION INTRAMUSCULAR; INTRAVENOUS
Status: CANCELLED
Start: 2023-04-15

## 2023-04-14 RX ORDER — HEPARIN SODIUM,PORCINE 10 UNIT/ML
5 VIAL (ML) INTRAVENOUS
Status: CANCELLED | OUTPATIENT
Start: 2023-04-15

## 2023-04-14 RX ORDER — ALBUTEROL SULFATE 90 UG/1
1-2 AEROSOL, METERED RESPIRATORY (INHALATION)
Status: CANCELLED
Start: 2023-04-15

## 2023-04-14 RX ADMIN — IRON SUCROSE 300 MG: 20 INJECTION, SOLUTION INTRAVENOUS at 14:15

## 2023-04-14 NOTE — PROGRESS NOTES
Infusion Nursing Note:  Ann ANDUJAR Giltorrietimmarielena presents today for venofer 2 of 3.    Patient seen by provider today: No   present during visit today: Not Applicable.    Note: N/A.      Intravenous Access:  Peripheral IV placed.    Treatment Conditions:  Not Applicable.      Post Infusion Assessment:  Patient tolerated infusion without incident.  Blood return noted pre and post infusion.  Site patent and intact, free from redness, edema or discomfort.  No evidence of extravasations.  Access discontinued per protocol.       Discharge Plan:   Discharge instructions reviewed with: Patient.  Patient and/or family verbalized understanding of discharge instructions and all questions answered.  Copy of AVS reviewed with patient and/or family.  Patient will return 4/17 at [facility] for next appointment.  Patient discharged in stable condition accompanied by: self.  Departure Mode: Ambulatory.      Brittany Nolan RN

## 2023-04-17 ENCOUNTER — INFUSION THERAPY VISIT (OUTPATIENT)
Dept: INFUSION THERAPY | Facility: CLINIC | Age: 53
End: 2023-04-17
Attending: OBSTETRICS & GYNECOLOGY
Payer: COMMERCIAL

## 2023-04-17 VITALS
RESPIRATION RATE: 16 BRPM | TEMPERATURE: 98.4 F | SYSTOLIC BLOOD PRESSURE: 130 MMHG | DIASTOLIC BLOOD PRESSURE: 76 MMHG | HEART RATE: 72 BPM | OXYGEN SATURATION: 100 %

## 2023-04-17 DIAGNOSIS — D50.0 IRON DEFICIENCY ANEMIA SECONDARY TO BLOOD LOSS (CHRONIC): Primary | ICD-10-CM

## 2023-04-17 DIAGNOSIS — T45.4X5D ADVERSE EFFECT OF IRON, SUBSEQUENT ENCOUNTER: ICD-10-CM

## 2023-04-17 LAB
ABO/RH(D): NORMAL
ANTIBODY SCREEN: NEGATIVE
SPECIMEN EXPIRATION DATE: NORMAL

## 2023-04-17 PROCEDURE — 258N000003 HC RX IP 258 OP 636: Performed by: OBSTETRICS & GYNECOLOGY

## 2023-04-17 PROCEDURE — 250N000011 HC RX IP 250 OP 636: Performed by: OBSTETRICS & GYNECOLOGY

## 2023-04-17 PROCEDURE — 96365 THER/PROPH/DIAG IV INF INIT: CPT

## 2023-04-17 RX ORDER — ALBUTEROL SULFATE 90 UG/1
1-2 AEROSOL, METERED RESPIRATORY (INHALATION)
Status: CANCELLED
Start: 2023-04-18

## 2023-04-17 RX ORDER — MEPERIDINE HYDROCHLORIDE 25 MG/ML
25 INJECTION INTRAMUSCULAR; INTRAVENOUS; SUBCUTANEOUS EVERY 30 MIN PRN
Status: CANCELLED | OUTPATIENT
Start: 2023-04-18

## 2023-04-17 RX ORDER — HEPARIN SODIUM,PORCINE 10 UNIT/ML
5 VIAL (ML) INTRAVENOUS
Status: CANCELLED | OUTPATIENT
Start: 2023-04-18

## 2023-04-17 RX ORDER — ALBUTEROL SULFATE 0.83 MG/ML
2.5 SOLUTION RESPIRATORY (INHALATION)
Status: CANCELLED | OUTPATIENT
Start: 2023-04-18

## 2023-04-17 RX ORDER — METHYLPREDNISOLONE SODIUM SUCCINATE 125 MG/2ML
125 INJECTION, POWDER, LYOPHILIZED, FOR SOLUTION INTRAMUSCULAR; INTRAVENOUS
Status: CANCELLED
Start: 2023-04-18

## 2023-04-17 RX ORDER — DIPHENHYDRAMINE HYDROCHLORIDE 50 MG/ML
50 INJECTION INTRAMUSCULAR; INTRAVENOUS
Status: CANCELLED
Start: 2023-04-18

## 2023-04-17 RX ORDER — HEPARIN SODIUM (PORCINE) LOCK FLUSH IV SOLN 100 UNIT/ML 100 UNIT/ML
5 SOLUTION INTRAVENOUS
Status: CANCELLED | OUTPATIENT
Start: 2023-04-18

## 2023-04-17 RX ORDER — EPINEPHRINE 1 MG/ML
0.3 INJECTION, SOLUTION INTRAMUSCULAR; SUBCUTANEOUS EVERY 5 MIN PRN
Status: CANCELLED | OUTPATIENT
Start: 2023-04-18

## 2023-04-17 RX ADMIN — SODIUM CHLORIDE 250 ML: 9 INJECTION, SOLUTION INTRAVENOUS at 08:52

## 2023-04-17 RX ADMIN — IRON SUCROSE 300 MG: 20 INJECTION, SOLUTION INTRAVENOUS at 08:51

## 2023-04-17 ASSESSMENT — PAIN SCALES - GENERAL: PAINLEVEL: NO PAIN (0)

## 2023-04-17 NOTE — PROGRESS NOTES
Infusion Nursing Note:  Azeemsaramarielena Parmar presents today for venofer 3/3.    Patient seen by provider today: No   present during visit today: Not Applicable.    Note: N/A.      Intravenous Access:  Peripheral IV placed.    Treatment Conditions:  Not Applicable.      Post Infusion Assessment:  Patient tolerated infusion without incident.  Blood return noted pre and post infusion.  Site patent and intact, free from redness, edema or discomfort.  No evidence of extravasations.  Access discontinued per protocol.       Discharge Plan:   Discharge instructions reviewed with: Patient.  Patient and/or family verbalized understanding of discharge instructions and all questions answered.  AVS to patient via [employer].  Patient discharged in stable condition accompanied by: self.  Departure Mode: Ambulatory.      David Wagner RN

## 2023-04-18 ENCOUNTER — LAB (OUTPATIENT)
Dept: LAB | Facility: CLINIC | Age: 53
End: 2023-04-18
Payer: COMMERCIAL

## 2023-04-18 DIAGNOSIS — Z01.818 PRE-OP TESTING: ICD-10-CM

## 2023-04-18 PROCEDURE — 36415 COLL VENOUS BLD VENIPUNCTURE: CPT

## 2023-04-18 PROCEDURE — 86850 RBC ANTIBODY SCREEN: CPT

## 2023-04-19 ENCOUNTER — HOSPITAL ENCOUNTER (OUTPATIENT)
Facility: CLINIC | Age: 53
Discharge: HOME | End: 2023-04-19
Attending: OBSTETRICS & GYNECOLOGY | Admitting: OBSTETRICS & GYNECOLOGY
Payer: COMMERCIAL

## 2023-04-19 ENCOUNTER — ANESTHESIA EVENT (OUTPATIENT)
Dept: SURGERY | Facility: CLINIC | Age: 53
End: 2023-04-19
Payer: COMMERCIAL

## 2023-04-19 ENCOUNTER — ANESTHESIA (OUTPATIENT)
Dept: SURGERY | Facility: CLINIC | Age: 53
End: 2023-04-19
Payer: COMMERCIAL

## 2023-04-19 VITALS
WEIGHT: 128.5 LBS | SYSTOLIC BLOOD PRESSURE: 105 MMHG | DIASTOLIC BLOOD PRESSURE: 67 MMHG | HEIGHT: 60 IN | BODY MASS INDEX: 25.23 KG/M2 | RESPIRATION RATE: 18 BRPM | HEART RATE: 70 BPM | OXYGEN SATURATION: 95 % | TEMPERATURE: 97 F

## 2023-04-19 DIAGNOSIS — Z90.710 S/P HYSTERECTOMY: Primary | ICD-10-CM

## 2023-04-19 LAB
ERYTHROCYTE [DISTWIDTH] IN BLOOD BY AUTOMATED COUNT: 35.9 % (ref 10–15)
HCG SERPL QL: NEGATIVE
HCT VFR BLD AUTO: 39.4 % (ref 35–47)
HGB BLD-MCNC: 11.1 G/DL (ref 11.7–15.7)
MCH RBC QN AUTO: 18.4 PG (ref 26.5–33)
MCHC RBC AUTO-ENTMCNC: 28.2 G/DL (ref 31.5–36.5)
MCV RBC AUTO: 65 FL (ref 78–100)
PLATELET # BLD AUTO: 390 10E3/UL (ref 150–450)
RBC # BLD AUTO: 6.03 10E6/UL (ref 3.8–5.2)
WBC # BLD AUTO: 6.4 10E3/UL (ref 4–11)

## 2023-04-19 PROCEDURE — 250N000009 HC RX 250: Performed by: NURSE ANESTHETIST, CERTIFIED REGISTERED

## 2023-04-19 PROCEDURE — 258N000003 HC RX IP 258 OP 636: Performed by: OBSTETRICS & GYNECOLOGY

## 2023-04-19 PROCEDURE — 710N000012 HC RECOVERY PHASE 2, PER MINUTE: Performed by: OBSTETRICS & GYNECOLOGY

## 2023-04-19 PROCEDURE — 360N000077 HC SURGERY LEVEL 4, PER MIN: Performed by: OBSTETRICS & GYNECOLOGY

## 2023-04-19 PROCEDURE — 84703 CHORIONIC GONADOTROPIN ASSAY: CPT | Performed by: OBSTETRICS & GYNECOLOGY

## 2023-04-19 PROCEDURE — 250N000011 HC RX IP 250 OP 636: Performed by: OBSTETRICS & GYNECOLOGY

## 2023-04-19 PROCEDURE — 258N000003 HC RX IP 258 OP 636: Performed by: NURSE ANESTHETIST, CERTIFIED REGISTERED

## 2023-04-19 PROCEDURE — 250N000009 HC RX 250: Performed by: OBSTETRICS & GYNECOLOGY

## 2023-04-19 PROCEDURE — 88307 TISSUE EXAM BY PATHOLOGIST: CPT | Mod: 26 | Performed by: PATHOLOGY

## 2023-04-19 PROCEDURE — 370N000017 HC ANESTHESIA TECHNICAL FEE, PER MIN: Performed by: OBSTETRICS & GYNECOLOGY

## 2023-04-19 PROCEDURE — 250N000025 HC SEVOFLURANE, PER MIN: Performed by: OBSTETRICS & GYNECOLOGY

## 2023-04-19 PROCEDURE — 710N000009 HC RECOVERY PHASE 1, LEVEL 1, PER MIN: Performed by: OBSTETRICS & GYNECOLOGY

## 2023-04-19 PROCEDURE — 88307 TISSUE EXAM BY PATHOLOGIST: CPT | Mod: TC | Performed by: OBSTETRICS & GYNECOLOGY

## 2023-04-19 PROCEDURE — 272N000001 HC OR GENERAL SUPPLY STERILE: Performed by: OBSTETRICS & GYNECOLOGY

## 2023-04-19 PROCEDURE — 85027 COMPLETE CBC AUTOMATED: CPT | Performed by: OBSTETRICS & GYNECOLOGY

## 2023-04-19 PROCEDURE — 250N000011 HC RX IP 250 OP 636: Performed by: NURSE ANESTHETIST, CERTIFIED REGISTERED

## 2023-04-19 PROCEDURE — 258N000001 HC RX 258: Performed by: OBSTETRICS & GYNECOLOGY

## 2023-04-19 PROCEDURE — 36415 COLL VENOUS BLD VENIPUNCTURE: CPT | Performed by: OBSTETRICS & GYNECOLOGY

## 2023-04-19 PROCEDURE — 999N000141 HC STATISTIC PRE-PROCEDURE NURSING ASSESSMENT: Performed by: OBSTETRICS & GYNECOLOGY

## 2023-04-19 PROCEDURE — 250N000013 HC RX MED GY IP 250 OP 250 PS 637: Performed by: OBSTETRICS & GYNECOLOGY

## 2023-04-19 RX ORDER — OXYCODONE HYDROCHLORIDE 5 MG/1
5 TABLET ORAL
Status: DISCONTINUED | OUTPATIENT
Start: 2023-04-19 | End: 2023-04-19 | Stop reason: HOSPADM

## 2023-04-19 RX ORDER — OXYCODONE HYDROCHLORIDE 5 MG/1
5-10 TABLET ORAL EVERY 4 HOURS PRN
Qty: 6 TABLET | Refills: 0 | Status: SHIPPED | OUTPATIENT
Start: 2023-04-19

## 2023-04-19 RX ORDER — MAGNESIUM HYDROXIDE 1200 MG/15ML
LIQUID ORAL PRN
Status: DISCONTINUED | OUTPATIENT
Start: 2023-04-19 | End: 2023-04-19 | Stop reason: HOSPADM

## 2023-04-19 RX ORDER — HYDROMORPHONE HYDROCHLORIDE 1 MG/ML
INJECTION, SOLUTION INTRAMUSCULAR; INTRAVENOUS; SUBCUTANEOUS PRN
Status: DISCONTINUED | OUTPATIENT
Start: 2023-04-19 | End: 2023-04-19

## 2023-04-19 RX ORDER — ONDANSETRON 2 MG/ML
INJECTION INTRAMUSCULAR; INTRAVENOUS PRN
Status: DISCONTINUED | OUTPATIENT
Start: 2023-04-19 | End: 2023-04-19

## 2023-04-19 RX ORDER — ONDANSETRON 2 MG/ML
4 INJECTION INTRAMUSCULAR; INTRAVENOUS EVERY 30 MIN PRN
Status: DISCONTINUED | OUTPATIENT
Start: 2023-04-19 | End: 2023-04-19 | Stop reason: HOSPADM

## 2023-04-19 RX ORDER — DEXAMETHASONE SODIUM PHOSPHATE 4 MG/ML
INJECTION, SOLUTION INTRA-ARTICULAR; INTRALESIONAL; INTRAMUSCULAR; INTRAVENOUS; SOFT TISSUE PRN
Status: DISCONTINUED | OUTPATIENT
Start: 2023-04-19 | End: 2023-04-19

## 2023-04-19 RX ORDER — CEFAZOLIN SODIUM/WATER 2 G/20 ML
2 SYRINGE (ML) INTRAVENOUS SEE ADMIN INSTRUCTIONS
Status: DISCONTINUED | OUTPATIENT
Start: 2023-04-19 | End: 2023-04-19 | Stop reason: HOSPADM

## 2023-04-19 RX ORDER — FENTANYL CITRATE 50 UG/ML
25 INJECTION, SOLUTION INTRAMUSCULAR; INTRAVENOUS EVERY 5 MIN PRN
Status: DISCONTINUED | OUTPATIENT
Start: 2023-04-19 | End: 2023-04-19 | Stop reason: HOSPADM

## 2023-04-19 RX ORDER — MEPERIDINE HYDROCHLORIDE 25 MG/ML
12.5 INJECTION INTRAMUSCULAR; INTRAVENOUS; SUBCUTANEOUS EVERY 5 MIN PRN
Status: DISCONTINUED | OUTPATIENT
Start: 2023-04-19 | End: 2023-04-19 | Stop reason: HOSPADM

## 2023-04-19 RX ORDER — CEFAZOLIN SODIUM/WATER 2 G/20 ML
2 SYRINGE (ML) INTRAVENOUS
Status: COMPLETED | OUTPATIENT
Start: 2023-04-19 | End: 2023-04-19

## 2023-04-19 RX ORDER — FENTANYL CITRATE 50 UG/ML
50 INJECTION, SOLUTION INTRAMUSCULAR; INTRAVENOUS EVERY 5 MIN PRN
Status: DISCONTINUED | OUTPATIENT
Start: 2023-04-19 | End: 2023-04-19 | Stop reason: HOSPADM

## 2023-04-19 RX ORDER — ACETAMINOPHEN 325 MG/1
975 TABLET ORAL ONCE
Status: DISCONTINUED | OUTPATIENT
Start: 2023-04-19 | End: 2023-04-19 | Stop reason: HOSPADM

## 2023-04-19 RX ORDER — ACETAMINOPHEN 325 MG/1
975 TABLET ORAL ONCE
Status: COMPLETED | OUTPATIENT
Start: 2023-04-19 | End: 2023-04-19

## 2023-04-19 RX ORDER — HYDROMORPHONE HCL IN WATER/PF 6 MG/30 ML
0.2 PATIENT CONTROLLED ANALGESIA SYRINGE INTRAVENOUS EVERY 5 MIN PRN
Status: DISCONTINUED | OUTPATIENT
Start: 2023-04-19 | End: 2023-04-19 | Stop reason: HOSPADM

## 2023-04-19 RX ORDER — SCOLOPAMINE TRANSDERMAL SYSTEM 1 MG/1
1 PATCH, EXTENDED RELEASE TRANSDERMAL ONCE
Status: COMPLETED | OUTPATIENT
Start: 2023-04-19 | End: 2023-04-19

## 2023-04-19 RX ORDER — POLYETHYLENE GLYCOL 3350 17 G/17G
1 POWDER, FOR SOLUTION ORAL DAILY
Qty: 238 G | Refills: 0 | Status: SHIPPED | OUTPATIENT
Start: 2023-04-19 | End: 2023-05-03

## 2023-04-19 RX ORDER — OXYCODONE HYDROCHLORIDE 5 MG/1
5 TABLET ORAL
Status: CANCELLED | OUTPATIENT
Start: 2023-04-19

## 2023-04-19 RX ORDER — OXYCODONE HYDROCHLORIDE 5 MG/1
10 TABLET ORAL
Status: CANCELLED | OUTPATIENT
Start: 2023-04-19

## 2023-04-19 RX ORDER — ALBUTEROL SULFATE 0.83 MG/ML
2.5 SOLUTION RESPIRATORY (INHALATION) EVERY 4 HOURS PRN
Status: DISCONTINUED | OUTPATIENT
Start: 2023-04-19 | End: 2023-04-19 | Stop reason: HOSPADM

## 2023-04-19 RX ORDER — IBUPROFEN 400 MG/1
800 TABLET, FILM COATED ORAL ONCE
Status: DISCONTINUED | OUTPATIENT
Start: 2023-04-19 | End: 2023-04-19 | Stop reason: HOSPADM

## 2023-04-19 RX ORDER — PROPOFOL 10 MG/ML
INJECTION, EMULSION INTRAVENOUS PRN
Status: DISCONTINUED | OUTPATIENT
Start: 2023-04-19 | End: 2023-04-19

## 2023-04-19 RX ORDER — FENTANYL CITRATE 50 UG/ML
INJECTION, SOLUTION INTRAMUSCULAR; INTRAVENOUS PRN
Status: DISCONTINUED | OUTPATIENT
Start: 2023-04-19 | End: 2023-04-19

## 2023-04-19 RX ORDER — HYDRALAZINE HYDROCHLORIDE 20 MG/ML
2.5-5 INJECTION INTRAMUSCULAR; INTRAVENOUS EVERY 10 MIN PRN
Status: DISCONTINUED | OUTPATIENT
Start: 2023-04-19 | End: 2023-04-19 | Stop reason: HOSPADM

## 2023-04-19 RX ORDER — GLYCOPYRROLATE 0.2 MG/ML
INJECTION, SOLUTION INTRAMUSCULAR; INTRAVENOUS PRN
Status: DISCONTINUED | OUTPATIENT
Start: 2023-04-19 | End: 2023-04-19

## 2023-04-19 RX ORDER — LIDOCAINE HYDROCHLORIDE 20 MG/ML
INJECTION, SOLUTION INFILTRATION; PERINEURAL PRN
Status: DISCONTINUED | OUTPATIENT
Start: 2023-04-19 | End: 2023-04-19

## 2023-04-19 RX ORDER — ONDANSETRON 4 MG/1
4 TABLET, ORALLY DISINTEGRATING ORAL EVERY 30 MIN PRN
Status: DISCONTINUED | OUTPATIENT
Start: 2023-04-19 | End: 2023-04-19 | Stop reason: HOSPADM

## 2023-04-19 RX ORDER — ONDANSETRON 4 MG/1
4 TABLET, ORALLY DISINTEGRATING ORAL EVERY 8 HOURS PRN
Qty: 4 TABLET | Refills: 0 | Status: SHIPPED | OUTPATIENT
Start: 2023-04-19

## 2023-04-19 RX ORDER — SODIUM CHLORIDE, SODIUM LACTATE, POTASSIUM CHLORIDE, CALCIUM CHLORIDE 600; 310; 30; 20 MG/100ML; MG/100ML; MG/100ML; MG/100ML
INJECTION, SOLUTION INTRAVENOUS CONTINUOUS PRN
Status: DISCONTINUED | OUTPATIENT
Start: 2023-04-19 | End: 2023-04-19

## 2023-04-19 RX ORDER — FENTANYL CITRATE 0.05 MG/ML
25 INJECTION, SOLUTION INTRAMUSCULAR; INTRAVENOUS
Status: CANCELLED | OUTPATIENT
Start: 2023-04-19

## 2023-04-19 RX ORDER — DEXMEDETOMIDINE HYDROCHLORIDE 4 UG/ML
INJECTION, SOLUTION INTRAVENOUS PRN
Status: DISCONTINUED | OUTPATIENT
Start: 2023-04-19 | End: 2023-04-19

## 2023-04-19 RX ORDER — KETOROLAC TROMETHAMINE 30 MG/ML
INJECTION, SOLUTION INTRAMUSCULAR; INTRAVENOUS PRN
Status: DISCONTINUED | OUTPATIENT
Start: 2023-04-19 | End: 2023-04-19

## 2023-04-19 RX ORDER — SODIUM CHLORIDE, SODIUM LACTATE, POTASSIUM CHLORIDE, CALCIUM CHLORIDE 600; 310; 30; 20 MG/100ML; MG/100ML; MG/100ML; MG/100ML
INJECTION, SOLUTION INTRAVENOUS CONTINUOUS
Status: DISCONTINUED | OUTPATIENT
Start: 2023-04-19 | End: 2023-04-19 | Stop reason: HOSPADM

## 2023-04-19 RX ORDER — NEOSTIGMINE METHYLSULFATE 1 MG/ML
VIAL (ML) INJECTION PRN
Status: DISCONTINUED | OUTPATIENT
Start: 2023-04-19 | End: 2023-04-19

## 2023-04-19 RX ORDER — HYDROMORPHONE HCL IN WATER/PF 6 MG/30 ML
0.4 PATIENT CONTROLLED ANALGESIA SYRINGE INTRAVENOUS EVERY 5 MIN PRN
Status: DISCONTINUED | OUTPATIENT
Start: 2023-04-19 | End: 2023-04-19 | Stop reason: HOSPADM

## 2023-04-19 RX ORDER — FUROSEMIDE 10 MG/ML
INJECTION INTRAMUSCULAR; INTRAVENOUS PRN
Status: DISCONTINUED | OUTPATIENT
Start: 2023-04-19 | End: 2023-04-19

## 2023-04-19 RX ORDER — PROPOFOL 10 MG/ML
INJECTION, EMULSION INTRAVENOUS CONTINUOUS PRN
Status: DISCONTINUED | OUTPATIENT
Start: 2023-04-19 | End: 2023-04-19

## 2023-04-19 RX ORDER — LABETALOL HYDROCHLORIDE 5 MG/ML
10 INJECTION, SOLUTION INTRAVENOUS
Status: DISCONTINUED | OUTPATIENT
Start: 2023-04-19 | End: 2023-04-19 | Stop reason: HOSPADM

## 2023-04-19 RX ORDER — CLINDAMYCIN PHOSPHATE 900 MG/50ML
INJECTION, SOLUTION INTRAVENOUS PRN
Status: DISCONTINUED | OUTPATIENT
Start: 2023-04-19 | End: 2023-04-19

## 2023-04-19 RX ADMIN — ROCURONIUM BROMIDE 20 MG: 50 INJECTION, SOLUTION INTRAVENOUS at 11:24

## 2023-04-19 RX ADMIN — FUROSEMIDE 10 MG: 10 INJECTION, SOLUTION INTRAVENOUS at 14:28

## 2023-04-19 RX ADMIN — PHENYLEPHRINE HYDROCHLORIDE 100 MCG: 10 INJECTION INTRAVENOUS at 10:52

## 2023-04-19 RX ADMIN — ACETAMINOPHEN 975 MG: 325 TABLET, FILM COATED ORAL at 10:19

## 2023-04-19 RX ADMIN — KETOROLAC TROMETHAMINE 15 MG: 30 INJECTION, SOLUTION INTRAMUSCULAR at 14:31

## 2023-04-19 RX ADMIN — HYDROMORPHONE HYDROCHLORIDE 0.3 MG: 1 INJECTION, SOLUTION INTRAMUSCULAR; INTRAVENOUS; SUBCUTANEOUS at 14:22

## 2023-04-19 RX ADMIN — Medication 2 G: at 14:15

## 2023-04-19 RX ADMIN — CLINDAMYCIN PHOSPHATE 900 MG: 900 INJECTION, SOLUTION INTRAVENOUS at 13:14

## 2023-04-19 RX ADMIN — PHENYLEPHRINE HYDROCHLORIDE 100 MCG: 10 INJECTION INTRAVENOUS at 10:44

## 2023-04-19 RX ADMIN — LIDOCAINE HYDROCHLORIDE 80 MG: 20 INJECTION, SOLUTION INFILTRATION; PERINEURAL at 10:29

## 2023-04-19 RX ADMIN — HYDROMORPHONE HYDROCHLORIDE 0.5 MG: 1 INJECTION, SOLUTION INTRAMUSCULAR; INTRAVENOUS; SUBCUTANEOUS at 11:12

## 2023-04-19 RX ADMIN — DEXMEDETOMIDINE HYDROCHLORIDE 8 MCG: 200 INJECTION INTRAVENOUS at 11:29

## 2023-04-19 RX ADMIN — GLYCOPYRROLATE 0.4 MG: 0.2 INJECTION, SOLUTION INTRAMUSCULAR; INTRAVENOUS at 14:33

## 2023-04-19 RX ADMIN — PROPOFOL 100 MCG/KG/MIN: 10 INJECTION, EMULSION INTRAVENOUS at 10:30

## 2023-04-19 RX ADMIN — MIDAZOLAM 2 MG: 1 INJECTION INTRAMUSCULAR; INTRAVENOUS at 10:23

## 2023-04-19 RX ADMIN — ROCURONIUM BROMIDE 40 MG: 50 INJECTION, SOLUTION INTRAVENOUS at 10:29

## 2023-04-19 RX ADMIN — FENTANYL CITRATE 100 MCG: 50 INJECTION, SOLUTION INTRAMUSCULAR; INTRAVENOUS at 10:29

## 2023-04-19 RX ADMIN — Medication 2 G: at 10:23

## 2023-04-19 RX ADMIN — SODIUM CHLORIDE, POTASSIUM CHLORIDE, SODIUM LACTATE AND CALCIUM CHLORIDE: 600; 310; 30; 20 INJECTION, SOLUTION INTRAVENOUS at 10:23

## 2023-04-19 RX ADMIN — SODIUM CHLORIDE, POTASSIUM CHLORIDE, SODIUM LACTATE AND CALCIUM CHLORIDE: 600; 310; 30; 20 INJECTION, SOLUTION INTRAVENOUS at 13:33

## 2023-04-19 RX ADMIN — NEOSTIGMINE METHYLSULFATE 3 MG: 1 INJECTION, SOLUTION INTRAVENOUS at 14:33

## 2023-04-19 RX ADMIN — DEXAMETHASONE SODIUM PHOSPHATE 4 MG: 4 INJECTION, SOLUTION INTRA-ARTICULAR; INTRALESIONAL; INTRAMUSCULAR; INTRAVENOUS; SOFT TISSUE at 10:35

## 2023-04-19 RX ADMIN — ONDANSETRON 4 MG: 2 INJECTION INTRAMUSCULAR; INTRAVENOUS at 14:15

## 2023-04-19 RX ADMIN — FUROSEMIDE 10 MG: 10 INJECTION, SOLUTION INTRAVENOUS at 14:07

## 2023-04-19 RX ADMIN — SCOPALAMINE 1 PATCH: 1 PATCH, EXTENDED RELEASE TRANSDERMAL at 10:18

## 2023-04-19 RX ADMIN — PROPOFOL 200 MG: 10 INJECTION, EMULSION INTRAVENOUS at 10:29

## 2023-04-19 ASSESSMENT — ACTIVITIES OF DAILY LIVING (ADL)
ADLS_ACUITY_SCORE: 35

## 2023-04-19 NOTE — DISCHARGE INSTRUCTIONS
Same Day Surgery Discharge Instructions for  Sedation and General Anesthesia     It's not unusual to feel dizzy, light-headed or faint for up to 24 hours after surgery or while taking pain medication.  If you have these symptoms: sit for a few minutes before standing and have someone assist you when you get up to walk or use the bathroom.    You should rest and relax for the next 24 hours. We recommend you make arrangements to have an adult stay with you for at least 24 hours after your discharge.  Avoid hazardous and strenuous activity.    DO NOT DRIVE any vehicle or operate mechanical equipment for 24 hours following the end of your surgery.  Even though you may feel normal, your reactions may be affected by the medication you have received.    Do not drink alcoholic beverages for 24 hours following surgery.     Slowly progress to your regular diet as you feel able. It's not unusual to feel nauseated and/or vomit after receiving anesthesia.  If you develop these symptoms, drink clear liquids (apple juice, ginger ale, broth, 7-up, etc. ) until you feel better.  If your nausea and vomiting persists for 24 hours, please notify your surgeon.      All narcotic pain medications, along with inactivity and anesthesia, can cause constipation. Drinking plenty of liquids and increasing fiber intake will help.    For any questions of a medical nature, call your surgeon.    Do not make important decisions for 24 hours.    If you had general anesthesia, you may have a sore throat for a couple of days related to the breathing tube used during surgery.  You may use Cepacol lozenges to help with this discomfort.  If it worsens or if you develop a fever, contact your surgeon.     If you feel your pain is not well managed with the pain medications prescribed by your surgeon, please contact your surgeon's office to let them know so they can address your concerns.      Today you received Toradol, an antiinflammatory medication similar  to Ibuprofen.  You should not take other antiinflammatory medication, such as Ibuprofen, Motrin, Advil, Aleve, Naprosyn, etc until 8:30PM    Today you were given 975 mg of Tylenol at 10:19am. The recommended daily maximum dose is 4000 mg.

## 2023-04-19 NOTE — ANESTHESIA PREPROCEDURE EVALUATION
Anesthesia Pre-Procedure Evaluation    Patient: Ann Parmar   MRN: [id number] : 1970        Procedure : Procedure(s):  LAPAROSCOPIC ASSISTED VAGINAL HYSTERECTOMY , BILATERAL SALPINGECTOMY  Cystoscopy          Past Medical History:   Diagnosis Date     Anemia      PONV (postoperative nausea and vomiting)      Uterine leiomyoma, unspecified location       Past Surgical History:   Procedure Laterality Date     ENDOMETRIAL BIOPSY       removal of ectopic fetus       WISDOM TOOTH EXTRACTION        No Known Allergies   Social History     Tobacco Use     Smoking status: Some Days     Types: Cigarettes     Smokeless tobacco: Never     Tobacco comments:     social smoker and has smoke exposure   Vaping Use     Vaping status: Never Used   Substance Use Topics     Alcohol use: Never      Wt Readings from Last 1 Encounters:   23 58.3 kg (128 lb 8 oz)        Anesthesia Evaluation   Pt has had prior anesthetic.     History of anesthetic complications  - PONV.      ROS/MED HX  ENT/Pulmonary:    (-) sleep apnea   Neurologic:    (-) no CVA   Cardiovascular:    (-) CAD   METS/Exercise Tolerance:     Hematologic:     (+) anemia,     Musculoskeletal:       GI/Hepatic:    (-) GERD   Renal/Genitourinary: Comment: Uterine leiomyoma      Endo:    (-) Type II DM   Psychiatric/Substance Use:       Infectious Disease:       Malignancy:       Other:            Physical Exam    Airway        Mallampati: II   TM distance: > 3 FB   Neck ROM: full   Mouth opening: > 3 cm    Respiratory Devices and Support         Dental       (+) Minor Abnormalities - some fillings, tiny chips      Cardiovascular   cardiovascular exam normal          Pulmonary   pulmonary exam normal                OUTSIDE LABS:  CBC:   Lab Results   Component Value Date    WBC 6.6 2023    WBC 6.5 2020    HGB 8.2 (L) 2023    HGB 5.8 (LL) 2020    HCT 31.9 (L) 2023    HCT 21.4 (L) 2020     2023      08/05/2020     BMP:   Lab Results   Component Value Date     03/30/2023     12/04/2019    POTASSIUM 4.2 03/30/2023    POTASSIUM 4.1 12/04/2019    CHLORIDE 105 03/30/2023    CHLORIDE 107 12/04/2019    CO2 24 03/30/2023    CO2 26 12/04/2019    BUN 15.8 03/30/2023    BUN 16 12/04/2019    CR 0.50 (L) 03/30/2023    CR 0.50 (L) 12/04/2019    GLC 91 03/30/2023     (H) 12/04/2019     COAGS: No results found for: PTT, INR, FIBR  POC:   Lab Results   Component Value Date    HCG Negative 07/15/2019     HEPATIC:   Lab Results   Component Value Date    ALBUMIN 4.3 03/30/2023    PROTTOTAL 7.8 03/30/2023    ALT 16 03/30/2023    AST 22 03/30/2023    ALKPHOS 66 03/30/2023    BILITOTAL 0.3 03/30/2023     OTHER:   Lab Results   Component Value Date    A1C 5.9 (H) 03/30/2023    FAUSTO 9.4 03/30/2023    MAG 2.3 07/16/2019    TSH 1.45 03/30/2023       Anesthesia Plan    ASA Status:  2   NPO Status:  NPO Appropriate    Anesthesia Type: General.     - Airway: ETT   Induction: Propofol, Intravenous.   Maintenance: TIVA.        Consents    Anesthesia Plan(s) and associated risks, benefits, and realistic alternatives discussed. Questions answered and patient/representative(s) expressed understanding.    - Discussed:     - Discussed with:  Patient         Postoperative Care    Pain management: Multi-modal analgesia.   PONV prophylaxis: Ondansetron (or other 5HT-3), Dexamethasone or Solumedrol, Scopolamine patch, Background Propofol Infusion     Comments:                Rula Mandel MD, MD

## 2023-04-19 NOTE — BRIEF OP NOTE
[facility] Brief Operative Note    Pre-operative diagnosis: Leiomyoma, intramural [D25.1]  Chronic blood loss anemia   Post-operative diagnosis Same   Procedure: Procedure(s):  LAPAROSCOPIC ASSISTED VAGINAL HYSTERECTOMY , BILATERAL SALPINGECTOMY  Cystoscopy  Rigid proctoscopy   Surgeon(s): Surgeon(s) and Role:  Panel 1:     * Nury Asher MD - Primary     * Carmen Cee MD - Assisting  Panel 2:     * Nury Asher MD - Primary     * Carmen Cee MD - Assisting  Panel 3:     * Elise Yañez MD - Primary   Estimated blood loss: 50 ml    Specimens: ID Type Source Tests Collected by Time Destination   1 : UTERUS, CERVIX, LEFT FALLOPIAN TUBE, PORTION OF RIGHT FALLOPIAN TUBE Tissue Uterus, Cervix, Bilateral Fallopian Tubes SURGICAL PATHOLOGY EXAM Nury Asher MD 4/19/2023 12:04 PM       Findings: On bimanual, the uterus is 12 week sized, anteverted, multiple fibroids palpated. On vaginal laparoscopy, the uterus is enlarged with multiple fibroids. The right ovary appears normal. The right fallopian tube is interrupted, consistent with prior ectopic pregnancy. The left fallopian tube and ovary are adherent to the pelvic side wall. There is a superficial serosal injury to the rectum, repaired. On cystoscopy, bilateral vigorous ureteral jets are seen and no evidence of bladder injury.     Patient stable to PACU.     MD Bettie Mayen OB/GYN  4/19/2023 2:11 PM

## 2023-04-19 NOTE — OR NURSING
Pt refusing pregnancy test.  Surgeon paged.  Surgeon requiring test.  Surgeon ordered CBC test as well.  Patient understands. And test completed.

## 2023-04-19 NOTE — ANESTHESIA CARE TRANSFER NOTE
Patient: Ann Parmar    Procedure: Procedure(s):  LAPAROSCOPIC ASSISTED VAGINAL HYSTERECTOMY , BILATERAL SALPINGECTOMY  Cystoscopy  Rigid proctoscopy       Diagnosis: Leiomyoma, intramural [D25.1]  Diagnosis Additional Information: No value filed.    Anesthesia Type:   General     Note:    Oropharynx: oropharynx clear of all foreign objects and spontaneously breathing  Level of Consciousness: awake  Oxygen Supplementation: face mask  Level of Supplemental Oxygen (L/min / FiO2): 6  Independent Airway: airway patency satisfactory and stable  Dentition: dentition unchanged  Vital Signs Stable: post-procedure vital signs reviewed and stable  Report to RN Given: handoff report given  Patient transferred to: PACU    Handoff Report: Identifed the Patient, Identified the Reponsible Provider, Reviewed the pertinent medical history, Discussed the surgical course, Reviewed Intra-OP anesthesia mangement and issues during anesthesia, Set expectations for post-procedure period and Allowed opportunity for questions and acknowledgement of understanding      Vitals:  Vitals Value Taken Time   /70 04/19/23 1450   Temp     Pulse 68 04/19/23 1456   Resp 18 04/19/23 1456   SpO2 100 % 04/19/23 1456   Vitals shown include unvalidated device data.    Electronically Signed By: CHOLO Diaz CRNA  April 19, 2023  2:57 PM

## 2023-04-19 NOTE — ANESTHESIA POSTPROCEDURE EVALUATION
Patient: Ann Parmar    Procedure: Procedure(s):  LAPAROSCOPIC ASSISTED VAGINAL HYSTERECTOMY , BILATERAL SALPINGECTOMY  Cystoscopy  Rigid proctoscopy       Anesthesia Type:  General    Note:     Postop Pain Control: Uneventful            Sign Out: Well controlled pain   PONV: No   Neuro/Psych: Uneventful            Sign Out: Acceptable/Baseline neuro status   Airway/Respiratory: Uneventful            Sign Out: Acceptable/Baseline resp. status   CV/Hemodynamics: Uneventful            Sign Out: Acceptable CV status; No obvious hypovolemia; No obvious fluid overload   Other NRE:    DID A NON-ROUTINE EVENT OCCUR? No           Last vitals:  Vitals Value Taken Time   /70 04/19/23 1600   Temp 36.4  C (97.5  F) 04/19/23 1530   Pulse 73 04/19/23 1609   Resp 34 04/19/23 1609   SpO2 95 % 04/19/23 1608   Vitals shown include unvalidated device data.    Electronically Signed By: Rula Mandel MD, MD  April 19, 2023  4:28 PM

## 2023-04-19 NOTE — ANESTHESIA PROCEDURE NOTES
Airway       Patient location during procedure: OR ([facility] - Operating Room or Procedural Area)       Procedure Start/Stop Times: 4/19/2023 10:31 AM  Staff -        Anesthesiologist:  Rula Mandel MD       CRNA: Amber Sunshine APRN CRNA       Performed By: CRNA  Consent for Airway        Urgency: elective  Indications and Patient Condition       Indications for airway management: fercho-procedural       Induction type:intravenous       Mask difficulty assessment: 1 - vent by mask    Final Airway Details       Final airway type: endotracheal airway       Successful airway: ETT - single  Endotracheal Airway Details        ETT size (mm): 7.0       Cuffed: yes       Cuff volume (mL): 7       Successful intubation technique: video laryngoscopy       VL Blade Size: Shearer 3       Grade View of Cords: 1       Adjucts: stylet       Position: Right       Measured from: gums/teeth       Secured at (cm): 20       Bite block used: None    Post intubation assessment        Number of attempts at approach: 1       Number of other approaches attempted: 0       Secured with: pink tape       Ease of procedure: easy       Dentition: Intact and Unchanged    Medication(s) Administered   Medication Administration Time: 4/19/2023 10:31 AM

## 2023-04-20 LAB
PATH REPORT.COMMENTS IMP SPEC: NORMAL
PATH REPORT.COMMENTS IMP SPEC: NORMAL
PATH REPORT.FINAL DX SPEC: NORMAL
PATH REPORT.GROSS SPEC: NORMAL
PATH REPORT.MICROSCOPIC SPEC OTHER STN: NORMAL
PATH REPORT.RELEVANT HX SPEC: NORMAL
PHOTO IMAGE: NORMAL

## 2023-04-20 NOTE — OP NOTE
[facility]  Operative Note    Name: Ann Parmar  MRN: [id number]  : 1970  Date of Surgery: 2023    Pre-operative Diagnosis:   Chronic blood loss anemia  Abnormal uterine bleeding  Fibroid uterus    Post-operative Diagnosis:   Chronic blood loss anemia  Abnormal uterine bleeding  Fibroid uterus    Procedure(s):   Laparoscopic assisted vaginal hysterectomy via vNOTES   Laparoscopic bilateral salpingectomy  Cystoscopy  Rigid Proctoscopy by Dr. Yañez    Surgeon: Nury Asher MD  Assistants: Carmen Cee MD    An assistant surgeon was utilized for the entire procedure due large myomatous uterus and anticipated difficult procedure, need for tissue retraction, dissection of vital structures, prevention and management of blood loss, and reduction in overall operative and anesthesia time.     Anesthesia: GETA  EBL: 50 mL   IV Fluids: 1400 mL crystalloid  Urine Output: 400 mL clear urine   Specimens: Uterus, cervix, left fallopian tube and portion of right fallopian tube  Complications: None apparent    Indications: Ann Parmar is a 53 year old female who presented with known fibroid uterus and chronic blood loss anemia. She had multiple ER visits in the past for acute vaginal bleeding with anemia requiring blood transfusions. She desired definitive management with hysterectomy. An endometrial biopsy was performed and was normal. The risks and benefits of and alternatives to the procedure were discussed with the patient, all questions were answered and written informed consent was obtained.     Findings: On bimanual, the uterus is 12 week sized, anteverted, multiple fibroids palpated. On vaginal laparoscopy, the uterus is enlarged with multiple fibroids. The right ovary appears normal. The right fallopian tube is interrupted, consistent with prior ectopic pregnancy. The left fallopian tube and ovary are adherent to the pelvic side wall. There is a superficial  serosal injury to the rectum, repaired. On cystoscopy, bilateral vigorous ureteral jets are seen and no evidence of bladder injury.     Procedure Details: The patient was taken to the OR and SCDs were placed. General endotracheal anesthesia was administered without complication.  She was placed in a dorsal lithotomy position using yellow fin type stirrups.  Bimanual exam revealed the above mentioned findings.  She was then prepped and draped in the usual sterile fashion. After time out was performed, a catheter was placed in her bladder. A weighted speculum was inserted into the posterior vagina.  The cervix was visualized and grasped with two single tooth tenaculums.  The cervicovaginal junction was injected circumferentially with dilute vasopressin. A circumferential colpotomy was then made with the scalpel.  The posterior cul-de-sac was entered sharply in the usual manner. The posterior peritoneum and vaginal cuff were tagged. A Doyen retractor was placed. The right uterosacral ligament was then clamped, transected, and suture ligated.  This was repeated on the left.  An additional pedicle was clamped, transected, and suture ligated bilaterally. Next the anterior peritoneum was carefully entered sharply and a retractor was placed. At this time, the Nathanael retractor was placed, followed by the GelSeal cap. The abdomen was insufflated. Using the LigaSure, the left uterine vessels were sealed and transected. Additional bites were taken to the level of the utero-ovarian ligaments. Attention was then turned to the right side, where the right uterine vessels were sealed and transected. The broad ligament and utero-ovarian ligaments were similarly cauterized/sealed and transected. Finally, the left utero-ovarian ligament was sealed and transected. Additional bites were taken on the left side until the uterus was freed. Attention was then turned to the right ovary, which appeared normal. Two portions of the right  fallopian tube were seen, consistent with prior ectopic pregnancy, and these two portions were removed using the LigaSure. The left ovary and fallopian tube were adherent to the right pelvic side wall. The left fallopian tube was dissected away from the pelvic sidewall and the mesosalpinx was cauterized and transected serially until the fallopian tube was freed and removed through a port.    The GelSeal cap was removed. The uterus was enlarged with multiple fibroids, and was difficult to extract vaginally. A scalpel was used to remove the uterus in pieces. The specimen was then handed off and sent to pathology.  The GelSeal cap was replaced and camera replaced to examine the pedicles laparoscopically. There was oozing noted to the right uterine artery pedicle and the LigaSure was used to assist with hemostasis.     While examining the pelvis, a very superficial laceration was noted to the serosa of the rectum, approximately 3mm. General surgery came to assess and recommended colorectal consultation. Laparoscopically, one interrupted stitch of 4-0 vicryl was placed to re-approximate the serosa. Dr. Yañez from colorectal performed an intra-operative consultation, please see her separate note. A rigid proctoscopy was performed by Dr. Yañez and was satisfactory.    The GelSeal cap and the Nathanael retractor was removed. Pedicles were examined and bleeding was noted to the right pedicle again. This was made hemostatic with the LigaSure and with a figure-of-x stitch using 0-vicryl. The posterior vaginal cuff and peritoneum were closed with a running locked stitchof 0-vicryl to ensure hemostasis. Oozing was then noted at the lateral edge of the left vaginal cuff, and a stitch was placed for hemostasis using 0-vicryl. The vaginal cuff was then closed with figure of x stitches of 0-vicryl. Good hemostasis was noted.     The rosa catheter was removed and the cystoscope inserted, with findings as noted above. The cystoscope  was removed.      The cuff was noted to be hemostatic at the end of the case. All counts were correct. The patient tolerated the procedure well.  She was taken to the recovery area in stable condition.      Nury Asher MD   [facility]

## 2023-06-02 ENCOUNTER — HEALTH MAINTENANCE LETTER (OUTPATIENT)
Age: 53
End: 2023-06-02

## 2024-06-23 ENCOUNTER — HEALTH MAINTENANCE LETTER (OUTPATIENT)
Age: 54
End: 2024-06-23

## 2025-06-21 ENCOUNTER — HEALTH MAINTENANCE LETTER (OUTPATIENT)
Age: 55
End: 2025-06-21

## 2025-07-12 ENCOUNTER — HEALTH MAINTENANCE LETTER (OUTPATIENT)
Age: 55
End: 2025-07-12

## (undated) DEVICE — SU VICRYL 0 CT-2 CR 8X18" J727D

## (undated) DEVICE — SUCTION IRR STRYKERFLOW II W/TIP 250-070-520

## (undated) DEVICE — Device

## (undated) DEVICE — ENDO SCOPE WARMER LF TM500

## (undated) DEVICE — LINEN TOWEL PACK X5 5464

## (undated) DEVICE — KIT PATIENT POSITIONING PIGAZZI LATEX FREE 40580

## (undated) DEVICE — SOL NACL 0.9% INJ 1000ML BAG 2B1324X

## (undated) DEVICE — ESU HOLDER LAP INST DISP PURPLE LONG 330MM H-PRO-330

## (undated) DEVICE — SU VICRYL 0 CT-1 36" J346H

## (undated) DEVICE — PREP SKIN SCRUB TRAY 4461A

## (undated) DEVICE — SPONGE RAY-TEC 4X8" 7318

## (undated) DEVICE — ESU LIGASURE MARYLAND LAPAROSCOPIC SLR/DVDR 5MMX37CM LF1937

## (undated) DEVICE — GRASPER LAPAROSCOPIC EPIX 5MMX35CM C4130

## (undated) DEVICE — SU VICRYL 3-0 SH 27" J316H

## (undated) DEVICE — TUBING SUCTION 12"X1/4" N612

## (undated) DEVICE — SUCTION TIP YANKAUER W/O VENT K86

## (undated) DEVICE — SU SILK 3-0 SH 30" K832H

## (undated) DEVICE — PAD CHUX UNDERPAD 30X30"

## (undated) DEVICE — DRAPE STERI INCISE 24X14" 1040

## (undated) DEVICE — SOL WATER IRRIG 1000ML BOTTLE 2F7114

## (undated) RX ORDER — FENTANYL CITRATE 50 UG/ML
INJECTION, SOLUTION INTRAMUSCULAR; INTRAVENOUS
Status: DISPENSED
Start: 2023-04-19

## (undated) RX ORDER — FUROSEMIDE 10 MG/ML
INJECTION INTRAMUSCULAR; INTRAVENOUS
Status: DISPENSED
Start: 2023-04-19

## (undated) RX ORDER — CEFAZOLIN SODIUM 1 G/3ML
INJECTION, POWDER, FOR SOLUTION INTRAMUSCULAR; INTRAVENOUS
Status: DISPENSED
Start: 2023-04-19

## (undated) RX ORDER — CEFAZOLIN SODIUM/WATER 2 G/20 ML
SYRINGE (ML) INTRAVENOUS
Status: DISPENSED
Start: 2023-04-19

## (undated) RX ORDER — DEXAMETHASONE SODIUM PHOSPHATE 4 MG/ML
INJECTION, SOLUTION INTRA-ARTICULAR; INTRALESIONAL; INTRAMUSCULAR; INTRAVENOUS; SOFT TISSUE
Status: DISPENSED
Start: 2023-04-19

## (undated) RX ORDER — GLYCOPYRROLATE 0.2 MG/ML
INJECTION, SOLUTION INTRAMUSCULAR; INTRAVENOUS
Status: DISPENSED
Start: 2023-04-19

## (undated) RX ORDER — ONDANSETRON 2 MG/ML
INJECTION INTRAMUSCULAR; INTRAVENOUS
Status: DISPENSED
Start: 2023-04-19

## (undated) RX ORDER — PROPOFOL 10 MG/ML
INJECTION, EMULSION INTRAVENOUS
Status: DISPENSED
Start: 2023-04-19

## (undated) RX ORDER — HYDROMORPHONE HYDROCHLORIDE 1 MG/ML
INJECTION, SOLUTION INTRAMUSCULAR; INTRAVENOUS; SUBCUTANEOUS
Status: DISPENSED
Start: 2023-04-19

## (undated) RX ORDER — ACETAMINOPHEN 325 MG/1
TABLET ORAL
Status: DISPENSED
Start: 2023-04-19

## (undated) RX ORDER — BUPIVACAINE HYDROCHLORIDE AND EPINEPHRINE 2.5; 5 MG/ML; UG/ML
INJECTION, SOLUTION EPIDURAL; INFILTRATION; INTRACAUDAL; PERINEURAL
Status: DISPENSED
Start: 2023-04-19

## (undated) RX ORDER — NEOSTIGMINE METHYLSULFATE 1 MG/ML
VIAL (ML) INJECTION
Status: DISPENSED
Start: 2023-04-19

## (undated) RX ORDER — SCOLOPAMINE TRANSDERMAL SYSTEM 1 MG/1
PATCH, EXTENDED RELEASE TRANSDERMAL
Status: DISPENSED
Start: 2023-04-19

## (undated) RX ORDER — VASOPRESSIN 20 U/ML
INJECTION PARENTERAL
Status: DISPENSED
Start: 2023-04-19